# Patient Record
Sex: FEMALE | Race: WHITE | NOT HISPANIC OR LATINO | Employment: FULL TIME | URBAN - METROPOLITAN AREA
[De-identification: names, ages, dates, MRNs, and addresses within clinical notes are randomized per-mention and may not be internally consistent; named-entity substitution may affect disease eponyms.]

---

## 2017-10-30 ENCOUNTER — NON-PROVIDER VISIT (OUTPATIENT)
Dept: URGENT CARE | Facility: PHYSICIAN GROUP | Age: 22
End: 2017-10-30

## 2017-10-31 NOTE — NON-PROVIDER
Vika Alvarez is a 21 y.o. female here for a non-provider visit for PPD placement -- Step 1 of 1    Reason for PPD:  work requirement    1. TB evaluation questionnaire completed by patient? Yes      -  If any answers marked yes did you contact a provider prior to placing? Not Indicated  2.  Patient notified to return to clinic for reading on: 11/01-11/02/2017  3.  PPD Placement documentation completed on TB evaluation questionnaire? Yes  4.  Location of TB evaluation questionnaire filed: Front office folder

## 2017-11-01 ENCOUNTER — NON-PROVIDER VISIT (OUTPATIENT)
Dept: URGENT CARE | Facility: PHYSICIAN GROUP | Age: 22
End: 2017-11-01

## 2017-11-01 NOTE — NON-PROVIDER
Vika Alvarez is a 21 y.o. female here for a non-provider visit for PPD reading -- Step 1 of 1.      1.  Resulted in Epic under enter/edit results? Yes   2.  TB evaluation questionnaire scanned into chart and original given to patient?Yes      3. Was induration greater than 0 mm? No.    If Step 1 of 2, when is patient returning for second step (delete if N/A): n/a    Routed to PCP? No

## 2019-04-25 ENCOUNTER — HOSPITAL ENCOUNTER (OUTPATIENT)
Dept: LAB | Facility: MEDICAL CENTER | Age: 24
End: 2019-04-25
Attending: OBSTETRICS & GYNECOLOGY
Payer: OTHER GOVERNMENT

## 2019-04-25 LAB
CYTOLOGY REG CYTOL: NORMAL
PATHOLOGY CONSULT NOTE: NORMAL

## 2019-04-25 PROCEDURE — 88305 TISSUE EXAM BY PATHOLOGIST: CPT

## 2019-04-25 PROCEDURE — 87624 HPV HI-RISK TYP POOLED RSLT: CPT

## 2019-04-25 PROCEDURE — 88175 CYTOPATH C/V AUTO FLUID REDO: CPT

## 2019-05-01 LAB
HPV HR 12 DNA CVX QL NAA+PROBE: POSITIVE
HPV16 DNA SPEC QL NAA+PROBE: NEGATIVE
HPV18 DNA SPEC QL NAA+PROBE: NEGATIVE
SPECIMEN SOURCE: ABNORMAL

## 2019-08-17 ENCOUNTER — NON-PROVIDER VISIT (OUTPATIENT)
Dept: URGENT CARE | Facility: PHYSICIAN GROUP | Age: 24
End: 2019-08-17
Payer: OTHER GOVERNMENT

## 2019-08-17 DIAGNOSIS — Z11.1 SCREENING FOR TUBERCULOSIS: Primary | ICD-10-CM

## 2019-08-17 PROCEDURE — 86580 TB INTRADERMAL TEST: CPT | Performed by: FAMILY MEDICINE

## 2019-08-17 NOTE — PROGRESS NOTES
Vika Alvarez is a 23 y.o. female here for a non-provider visit for PPD placement -- Step 1 of 1    Reason for PPD:  work requirement    1. TB evaluation questionnaire completed by patient? Yes      -  If any answers marked yes did you contact a provider prior to placing? No  2.  Patient notified to return to clinic for reading on: 08/19/2019  3.  PPD Placement documentation completed on TB evaluation questionnaire? Yes  4.  Location of TB evaluation questionnaire filed: front office

## 2019-08-19 ENCOUNTER — NON-PROVIDER VISIT (OUTPATIENT)
Dept: URGENT CARE | Facility: PHYSICIAN GROUP | Age: 24
End: 2019-08-19

## 2019-08-19 LAB — TB WHEAL 3D P 5 TU DIAM: 0 MM

## 2019-08-19 NOTE — PROGRESS NOTES
Vika Alvarez is a 23 y.o. female here for a non-provider visit for PPD reading -- Step 1 of 1.      1.  Resulted in Epic under enter/edit results? Yes   2.  TB evaluation questionnaire scanned into chart and original given to patient?Yes      3. Was induration greater than 0 mm? No.    Routed to PCP? No

## 2019-09-11 ENCOUNTER — HOSPITAL ENCOUNTER (OUTPATIENT)
Dept: RADIOLOGY | Facility: MEDICAL CENTER | Age: 24
End: 2019-09-11
Attending: FAMILY MEDICINE
Payer: OTHER GOVERNMENT

## 2019-09-11 ENCOUNTER — OFFICE VISIT (OUTPATIENT)
Dept: URGENT CARE | Facility: PHYSICIAN GROUP | Age: 24
End: 2019-09-11
Payer: OTHER GOVERNMENT

## 2019-09-11 VITALS
HEIGHT: 64 IN | TEMPERATURE: 98.4 F | WEIGHT: 130 LBS | OXYGEN SATURATION: 96 % | RESPIRATION RATE: 12 BRPM | DIASTOLIC BLOOD PRESSURE: 76 MMHG | HEART RATE: 82 BPM | SYSTOLIC BLOOD PRESSURE: 116 MMHG | BODY MASS INDEX: 22.2 KG/M2

## 2019-09-11 DIAGNOSIS — S67.01XA CRUSHING INJURY OF RIGHT THUMB, INITIAL ENCOUNTER: ICD-10-CM

## 2019-09-11 PROCEDURE — 99213 OFFICE O/P EST LOW 20 MIN: CPT | Performed by: FAMILY MEDICINE

## 2019-09-11 PROCEDURE — 73140 X-RAY EXAM OF FINGER(S): CPT | Mod: RT

## 2019-09-12 NOTE — PATIENT INSTRUCTIONS
Crush Injury, Fingers or Toes  A crush injury to the fingers or toes means the tissues have been damaged by being squeezed (compressed). There will be bleeding into the tissues and swelling. Often, blood will collect under the skin. When this happens, the skin on the finger often dies and may slough off (shed) 1 week to 10 days later. Usually, new skin is growing underneath. If the injury has been too severe and the tissue does not survive, the damaged tissue may begin to turn black over several days.   Wounds which occur because of the crushing may be stitched (sutured) shut. However, crush injuries are more likely to become infected than other injuries. These wounds may not be closed as tightly as other types of cuts to prevent infection. Nails involved are often lost. These usually grow back over several weeks.   DIAGNOSIS  X-rays may be taken to see if there is any injury to the bones.  TREATMENT  Broken bones (fractures) may be treated with splinting, depending on the fracture. Often, no treatment is required for fractures of the last bone in the fingers or toes.  HOME CARE INSTRUCTIONS   · The crushed part should be raised (elevated) above the heart or center of the chest as much as possible for the first several days or as directed. This helps with pain and lessens swelling. Less swelling increases the chances that the crushed part will survive.  · Put ice on the injured area.  ¨ Put ice in a plastic bag.  ¨ Place a towel between your skin and the bag.  ¨ Leave the ice on for 15-20 minutes, 03-04 times a day for the first 2 days.  · Only take over-the-counter or prescription medicines for pain, discomfort, or fever as directed by your caregiver.  · Use your injured part only as directed.  · Change your bandages (dressings) as directed.  · Keep all follow-up appointments as directed by your caregiver. Not keeping your appointment could result in a chronic or permanent injury, pain, and disability. If there is  any problem keeping the appointment, you must call to reschedule.  SEEK IMMEDIATE MEDICAL CARE IF:   · There is redness, swelling, or increasing pain in the wound area.  · Pus is coming from the wound.  · You have a fever.  · You notice a bad smell coming from the wound or dressing.  · The edges of the wound do not stay together after the sutures have been removed.  · You are unable to move the injured finger or toe.  MAKE SURE YOU:   · Understand these instructions.  · Will watch your condition.  · Will get help right away if you are not doing well or get worse.     This information is not intended to replace advice given to you by your health care provider. Make sure you discuss any questions you have with your health care provider.     Document Released: 12/18/2006 Document Revised: 03/11/2013 Document Reviewed: 05/04/2012  [employer] Interactive Patient Education ©2016 [employer]

## 2019-09-19 ASSESSMENT — ENCOUNTER SYMPTOMS
NUMBNESS: 0
FEVER: 0
JOINT SWELLING: 0
CHILLS: 0

## 2019-09-19 NOTE — PROGRESS NOTES
"Subjective:   Vika Alvarez is a 23 y.o. female who presents for Hand Injury (R thumb smashed in truck door, numb, tingling, painful to bend xthis morning)     Hand Injury   This is a new problem. The current episode started today. The problem occurs constantly. The problem has been gradually worsening. Pertinent negatives include no chills, fever, joint swelling or numbness.     Review of Systems   Constitutional: Negative for chills and fever.   Musculoskeletal: Negative for joint swelling.   Neurological: Negative for numbness.      Objective:   /76 (BP Location: Right arm, Patient Position: Sitting, BP Cuff Size: Adult)   Pulse 82   Temp 36.9 °C (98.4 °F) (Temporal)   Resp 12   Ht 1.626 m (5' 4\")   Wt 59 kg (130 lb)   LMP 08/06/2019 (Within Days)   SpO2 96%   BMI 22.31 kg/m²   Physical Exam   Constitutional: She is oriented to person, place, and time. She appears well-developed and well-nourished. No distress.   Eyes: Pupils are equal, round, and reactive to light. EOM are normal.   Cardiovascular: Normal rate, regular rhythm, normal heart sounds and intact distal pulses.   Pulmonary/Chest: Effort normal and breath sounds normal. No respiratory distress.   Abdominal: Soft. Bowel sounds are normal. She exhibits no distension.   Musculoskeletal: Normal range of motion.        Arms:  Neurological: She is alert and oriented to person, place, and time. She has normal reflexes.   Skin: Skin is warm and dry.   Psychiatric: She has a normal mood and affect. Her behavior is normal.        Assessment/Plan:   1. Crushing injury of right thumb, initial encounter  - DX-FINGER(S) 2+ RIGHT; Future  No acute fracture on my read. Use over-the-counter pain reliever, such as acetaminophen (Tylenol), ibuprofen (Advil, Motrin) or naproxen (Aleve) as needed; follow package directions for dosing.   Differential diagnosis, natural history, supportive care, and indications for immediate follow-up discussed.    "

## 2020-10-17 ENCOUNTER — OCCUPATIONAL MEDICINE (OUTPATIENT)
Dept: URGENT CARE | Facility: PHYSICIAN GROUP | Age: 25
End: 2020-10-17
Payer: COMMERCIAL

## 2020-10-17 VITALS
WEIGHT: 130 LBS | BODY MASS INDEX: 22.2 KG/M2 | SYSTOLIC BLOOD PRESSURE: 108 MMHG | RESPIRATION RATE: 18 BRPM | OXYGEN SATURATION: 98 % | DIASTOLIC BLOOD PRESSURE: 64 MMHG | HEIGHT: 64 IN | TEMPERATURE: 97.3 F | HEART RATE: 66 BPM

## 2020-10-17 DIAGNOSIS — S39.012A STRAIN OF LUMBAR REGION, INITIAL ENCOUNTER: ICD-10-CM

## 2020-10-17 DIAGNOSIS — R25.2 SPASM: ICD-10-CM

## 2020-10-17 PROCEDURE — 99214 OFFICE O/P EST MOD 30 MIN: CPT | Performed by: FAMILY MEDICINE

## 2020-10-17 RX ORDER — CYCLOBENZAPRINE HCL 10 MG
10 TABLET ORAL 3 TIMES DAILY PRN
Qty: 20 TAB | Refills: 0 | Status: SHIPPED | OUTPATIENT
Start: 2020-10-17 | End: 2024-02-20

## 2020-10-17 ASSESSMENT — ENCOUNTER SYMPTOMS
FLANK PAIN: 0
FOCAL WEAKNESS: 0
CHILLS: 0
SENSORY CHANGE: 0
FEVER: 0

## 2020-10-17 NOTE — LETTER
"EMPLOYEE’S CLAIM FOR COMPENSATION/ REPORT OF INITIAL TREATMENT  FORM C-4    EMPLOYEE’S CLAIM - PROVIDE ALL INFORMATION REQUESTED   First Name  Vika Last Name  Antonio Birthdate                    1995                Sex  female Claim Number   Home Address  [address] Age  24 y.o. 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 (if applicable)  Transfers/Changing residents     How did this injury or occupational disease occur? (Be specific an answer in detail. Use additional sheet if necessary)  1.) one resident was not helping transfer; could feel the strain starting in my back 2.) when changing a resident, was not in good position to due chair.  so or have proper help or equipment changed in a reclinging.     If you believe that you have an occupational disease, when did you first have knowledge of the disability and it relationship to your employment?  N/A Witnesses to the Accident  N/A      Nature of Injury or Occupational Disease  Strain  Part(s) of Body Injured or Affected  Lower Back Area (Lumbar Area & Lumbo-Sacral), N/A, N/A "    I certify that the above is true and correct to the best of my knowledge and that I have provided this information in order to obtain the benefits of [place]  I hereby authorize any physician, chiropractor, surgeon, practitioner, or other person, any hospital, including [facility] or [facility], any medical service organization, any insurance company, or other institution or organization to release to each other, any medical or other information, including benefits paid or payable, pertinent to this injury or disease, except information relative to diagnosis, treatment and/or counseling for AIDS, psychological conditions, alcohol or controlled substances, for which I must give specific authorization.  A Photostat of this authorization shall be as valid as the original.     Date   Place   Employee’s Signature   THIS REPORT MUST BE COMPLETED AND MAILED WITHIN 3 WORKING DAYS OF TREATMENT   Place  [facility]  Name of Facility  [facility]   Date  10/17/2020 Diagnosis  (S39.012A) Strain of lumbar region, initial encounter  (R25.2) Spasm Is there evidence the injured employee was under the              influence of alcohol and/or another controlled substance at the time of accident?   Hour  11:57 AM Description of Injury or Disease  Diagnoses of Strain of lumbar region, initial encounter and Spasm were pertinent to this visit. No   Treatment  relative rest, ice, otc nsaid, cyclobenzaprine    Have you advised the patient to remain off work five days or     more? No   X-Ray Findings    Comments:NA   If Yes   From Date  To Date      From information given by the employee, together with medical evidence, can you directly connect this injury or occupational disease as job incurred?  Yes If No Full Duty    No Modified Duty  Yes   Is additional medical care by a physician  "indicated?  Yes If Modified Duty, Specify any Limitations / Restrictions  Bending and stooping limited to 1 hour  Pushing, pulling, lifting, carrying limited to 2 hours  Weight limit 10 pounds   Do you know of any previous injury or disease contributing to this condition or occupational disease?                            No   Date  10/17/2020 Print Doctor’s Name   Brien Gunn M.D. I certify the employer’s copy of  this form was mailed on:   Address  [address] Insurer’s Use Only     [place]  [address]    Provider’s Tax ID Number  [id number] Telephone  Dept: [phone]      berkley-BRIEN Tipton M.D.  Signature:     Degree          ORIGINAL-TREATING PHYSICIAN OR CHIROPRACTOR    PAGE 2-INSURER/TPA    PAGE 3-EMPLOYER    PAGE 4-EMPLOYEE        Form C-4 (rev.10/07)          BRIEF DESCRIPTION OF RIGHTS AND BENEFITS  (Pursuant to [place] 616C.050)    Notice of Injury or Occupational Disease (Incident Report Form C-1): If an injury or occupational disease (OD) arises out of and in the course of employment, you must provide written notice to your employer as soon as practicable, but no later than 7 days after the accident or OD. Your employer shall maintain a sufficient supply of the required forms.     Claim for Compensation (Form C-4): If medical treatment is sought, the form C-4 is available at the place of initial treatment. A completed \"Claim for Compensation\" (Form C-4) must be filed within 90 days after an accident or OD. The treating physician or chiropractor must, within 3 working days after treatment, complete and mail to the employer, the employer's insurer and third-party , the Claim for Compensation.     Medical Treatment: If you require medical treatment for your on-the-job injury or OD, you may be required to select a physician or chiropractor from a list provided by your workers’ compensation insurer, if it has contracted with an Organization for Managed Care " (MCO) or Preferred Provider Organization (PPO) or providers of health care. If your employer has not entered into a contract with an MCO or PPO, you may select a physician or chiropractor from the Panel of Physicians and Chiropractors. Any medical costs related to your industrial injury or OD will be paid by your insurer.     Temporary Total Disability (TTD): If your doctor has certified that you are unable to work for a period of at least 5 consecutive days, or 5 cumulative days in a 20-day period, or places restrictions on you that your employer does not accommodate, you may be entitled to TTD compensation.     Temporary Partial Disability (TPD): If the wage you receive upon reemployment is less than the compensation for TTD to which you are entitled, the insurer may be required to pay you TPD compensation to make up the difference. TPD can only be paid for a maximum of 24 months.     Permanent Partial Disability (PPD): When your medical condition is stable and there is an indication of a PPD as a result of your injury or OD, within 30 days, your insurer must arrange for an evaluation by a rating physician or chiropractor to determine the degree of your PPD. The amount of your PPD award depends on the date of injury, the results of the PPD evaluation and your age and wage.     Permanent Total Disability (PTD): If you are medically certified by a treating physician or chiropractor as permanently and totally disabled and have been granted a PTD status by your insurer, you are entitled to receive monthly benefits not to exceed 66 2/3% of your average monthly wage. The amount of your PTD payments is subject to reduction if you previously received a PPD award.     Vocational Rehabilitation Services: You may be eligible for vocational rehabilitation services if you are unable to return to the job due to a permanent physical impairment or permanent restrictions as a result of your injury or occupational disease.          Transportation and Per Angi Reimbursement: You may be eligible for travel expenses and per angi associated with medical treatment.     Reopening: You may be able to reopen your claim if your condition worsens after claim closure.     Appeal Process: If you disagree with a written determination issued by the insurer or the insurer does not respond to your request, you may appeal to the Department of Administration, , by following the instructions contained in your determination letter. You must appeal the determination within 70 days from the date of the determination letter at [address], [address], [place], [place] [address], or [address], [address], [place], [place] [address]. If you disagree with the  decision, you may appeal to the Department of Administration, . You must file your appeal within 30 days from the date of the  decision letter at [address], [address], [place], [place] [address], or [address], [address], [place], [place] [address]. If you disagree with a decision of an , you may file a petition for judicial review with the District Court. You must do so within 30 days of the Appeal Officer’s decision. You may be represented by an  at your own expense or you may contact the [facility] for possible representation.     [place]  for Injured Workers (NAIW): If you disagree with a  decision, you may request that NAIW represent you without charge at an  Hearing. For information regarding denial of benefits, you may contact the [facility] at: [address], [address], [place] [address], [phone], or [address], [address] [address], [phone]     To File a Complaint with the Division: If you wish to file a complaint with the  of the Division of Industrial Relations (DIR), please contact the Workers’  Compensation Section, [address], [address], [place], [place] [address], telephone [phone], or [address], [address], [place], [place] [address], telephone [phone].     For assistance with Workers’ Compensation Issues: You may contact the Office of the Governor Consumer Health Assistance, [address] [place] [address], [address], [place], [place] [address], Toll Free 1-117.604.7351, Web site: http://govcha.[facility].nv., E-mail [email or web address]              __________________________________________________________________                              ___________________         Employee Name / Signature                                                                                                                     Date                                                                                                                                                                                       D-2 (rev. 01/20)

## 2020-10-17 NOTE — PROGRESS NOTES
"Subjective:      Vika Alvarez is a 24 y.o. female who presents with Work-Related Injury (DOI 10/16/2020. patiwent states that she injured her back with 2 residents. one patient she had to change in recliner causing pain in her lower to mid back, other resident didnt help while transerfering to other location. 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 Pain severity 6/10. No radiation. Pain worse with bending down and sitting. OTC tylenol with minimal improvement. No myelopathy. No fever. No PMH cancer/no unwanted weight loss. No prior injury or surgery. No second job or outside activity contributing.      HPI    Review of Systems   Constitutional: Negative for chills and fever.   Genitourinary: Negative for flank pain and hematuria.   Skin: Negative for rash.   Neurological: Negative for sensory change and focal weakness.     .  Medications, Allergies, and current problem list reviewed today in Epic       Objective:     /64 (BP Location: Left arm, Patient Position: Sitting, BP Cuff Size: Adult)   Pulse 66   Temp 36.3 °C (97.3 °F) (Temporal)   Resp 18   Ht 1.626 m (5' 4\")   Wt 59 kg (130 lb)   SpO2 98%   BMI 22.31 kg/m²      Physical Exam  Constitutional:       Appearance: Normal appearance.   Eyes:      Conjunctiva/sclera: Conjunctivae normal.   Neck:      Musculoskeletal: Normal range of motion and neck supple.   Skin:     General: Skin is warm and dry.   Neurological:      General: No focal deficit present.      Mental Status: She is alert and oriented to person, place, and time.         Back: tender and spasm left paralumber region, full range of motion. Pain reproduced with full flexion and right rotation.   Neuro: Bilateral lower extremity strength and sensory intact.  Negative straight leg raise. DTR 2+/4 bilateral knees         Assessment/Plan:        1. Strain of lumbar region, " initial encounter      2. Spasm    - cyclobenzaprine (FLEXERIL) 10 mg Tab; Take 1 Tab by mouth 3 times a day as needed for Muscle Spasms.  Dispense: 20 Tab; Refill: 0    Differential diagnosis, natural history, supportive care, and indications for immediate follow-up discussed at length.     Light duty OD 39  Relative rest, ice, OTC NSAID  Follow-up Wednesday 10/21

## 2020-10-17 NOTE — LETTER
[facility]  [address] - [place] [address]  Phone:  [phone] - Fax:  [phone]   [facility] Progress Report and Disability Certification  Date of Service: 10/17/2020   No Show:  No  Date / Time of Next Visit: 10/21/2020@3:00PM   Claim Information   Patient Name: Vika Alvarez  Claim Number:     Employer:   [employer] Date of Injury: 10/16/2020     Insurer / TPA:    ID / SSN:     Occupation: Med Tech/Caregiver  Diagnosis: Diagnoses of Strain of lumbar region, initial encounter and Spasm were pertinent to this visit.    Medical Information   Related to Industrial Injury? 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 Pain severity 6/10. No radiation. Pain worse with bending down and sitting. OTC tylenol with minimal improvement. No myelopathy. No fever. No PMH cancer/no unwanted weight loss. No prior injury or surgery. No second job or outside activity contributing.    Objective Findings: Back: tender and spasm left paralumber region, full range of motion. Pain reproduced with full flexion and right rotation.   Neuro: Bilateral lower extremity strength and sensory intact.  Negative straight leg raise. DTR 2+/4 bilateral knees     Pre-Existing Condition(s):     Assessment:   Initial Visit    Status: Additional Care Required  Permanent Disability:No    Plan:   Comments:relative rest, ice, otc nsaid, cyclobenzaprine    Diagnostics:   Comments:NA    Comments:       Disability Information   Status: Released to Restricted Duty    From:  10/17/2020  Through: 10/21/2020 Restrictions are: Temporary   Physical Restrictions   Sitting:    Standing:    Stooping:  < or = to 1 hr/day Bending:  < or = to 1 hr/day   Squatting:    Walking:    Climbing:    Pushing:  < or = to 2 hrs/day   Pulling:  < or = to 2  hrs/day Other:    Reaching Above Shoulder (L):   Reaching Above Shoulder (R):       Reaching Below Shoulder (L):    Reaching Below Shoulder (R):      Not to exceed Weight Limits   Carrying(hrs): 2 Weight Limit(lb): < or = to 10 pounds Lifting(hrs): 2 Weight  Limit(lb): < or = to 10 pounds   Comments:      Repetitive Actions   Hands: i.e. Fine Manipulations from Grasping:     Feet: i.e. Operating Foot Controls:     Driving / Operate Machinery:     Provider Name:   Brien Gunn M.D. Physician Signature:  Physician Name:     Clinic Name / Location: [address]  [place] [address] Clinic Phone Number: Dept: [phone]   Appointment Time: 11:15 Am Visit Start Time: 11:57 AM   Check-In Time:  11:44 Am Visit Discharge Time:  12:18PM   Original-Treating Physician or Chiropractor    Page 2-Insurer/TPA    Page 3-Employer    Page 4-Employee

## 2020-10-21 ENCOUNTER — OCCUPATIONAL MEDICINE (OUTPATIENT)
Dept: URGENT CARE | Facility: PHYSICIAN GROUP | Age: 25
End: 2020-10-21
Payer: COMMERCIAL

## 2020-10-21 VITALS
BODY MASS INDEX: 22.2 KG/M2 | RESPIRATION RATE: 16 BRPM | OXYGEN SATURATION: 99 % | HEIGHT: 64 IN | WEIGHT: 130 LBS | TEMPERATURE: 98.4 F | SYSTOLIC BLOOD PRESSURE: 112 MMHG | HEART RATE: 82 BPM | DIASTOLIC BLOOD PRESSURE: 74 MMHG

## 2020-10-21 DIAGNOSIS — S39.012D STRAIN OF LUMBAR REGION, SUBSEQUENT ENCOUNTER: ICD-10-CM

## 2020-10-21 PROCEDURE — 99214 OFFICE O/P EST MOD 30 MIN: CPT | Performed by: PHYSICIAN ASSISTANT

## 2020-10-21 ASSESSMENT — ENCOUNTER SYMPTOMS
ABDOMINAL PAIN: 0
SHORTNESS OF BREATH: 0
MUSCULOSKELETAL NEGATIVE: 1
CHILLS: 0
FEVER: 0
VOMITING: 0
DIARRHEA: 0
NAUSEA: 0
DIZZINESS: 0

## 2020-10-21 ASSESSMENT — PAIN SCALES - GENERAL: PAINLEVEL: 6=MODERATE PAIN

## 2020-10-21 NOTE — LETTER
[facility]  [address] [place] [address]. [address] - [place] [address]  Phone:  [phone] - Fax:  [phone]   [facility] Progress Report and Disability Certification  Date of Service: 10/21/2020   No Show:  No  Date / Time of Next Visit: 10/25/2020 @9:30AM   Claim Information   Patient Name: Vika Alvarez  Claim Number:     Employer:   [employer] Date of Injury: 10/16/2020     Insurer / TPA: [employer]  ID / SSN:     Occupation: Med Tech/Caregiver  Diagnosis: The encounter diagnosis was Strain of lumbar region, subsequent encounter.    Medical Information   Related to Industrial Injury? 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 No radiating pain. Pain worse with bending down and sitting. No myelopathy. No fever. No prior injury or surgery. No second job or outside activity contributing.  She was given flexeril at her initial visit that she has been taking as needed for stiffness with good relief. She has also been taking OTC ibuprofen intermittently, although she states the pain hasn't improved much since her initial visit.     Objective Findings: Musculoskeletal:      Lumbar back: She exhibits decreased range of motion, tenderness and pain. She exhibits no bony tenderness.      Comments: No midline spinal tenderness or step off deformities noted. Straight leg raise negative.       Pre-Existing Condition(s): None    Assessment:   Condition Same    Status: Additional Care Required  Permanent Disability:No    Plan: Medication  Comments:flexeril    Diagnostics:      Comments:       Disability Information   Status: Released to Restricted Duty    From:  10/21/2020  Through: 10/25/2020 Restrictions are: Temporary   Physical Restrictions   Sitting:    Standing:    Stooping:  < or = to 1 hr/day Bending:  < or = to 1  hr/day   Squatting:    Walking:    Climbing:    Pushing:  < or = to 2 hrs/day   Pulling:  < or = to 2 hrs/day Other:    Reaching Above Shoulder (L):   Reaching Above Shoulder (R):       Reaching Below Shoulder (L):    Reaching Below Shoulder (R):      Not to exceed Weight Limits   Carrying(hrs):   Weight Limit(lb): < or = to 10 pounds Lifting(hrs):   Weight  Limit(lb): < or = to 10 pounds   Comments: Encouraged icing/heating 2-3 times daily followed by gentle massage and stretching  OTC nsaids as needed for pain  Continue flexeril as needed for stiffness  RTC in 4 days for follow up    Repetitive Actions   Hands: i.e. Fine Manipulations from Grasping:     Feet: i.e. Operating Foot Controls:     Driving / Operate Machinery:     Provider Name:   Zaynab Wei P.A.-C. Physician Signature:  Physician Name:     Clinic Name / Location: [address]  [place] [address] Clinic Phone Number: Dept: [phone]   Appointment Time: 5:30 Pm Visit Start Time: 5:51 PM   Check-In Time:  5:19 Pm Visit Discharge Time:  6:17 PM   Original-Treating Physician or Chiropractor    Page 2-Insurer/TPA    Page 3-Employer    Page 4-Employee

## 2020-10-22 NOTE — PROGRESS NOTES
"Subjective:      Vika Alvarez is a 24 y.o. 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 No radiating pain. Pain worse with bending down and sitting. No myelopathy. No fever. No prior injury or surgery. No second job or outside activity contributing.  She was given flexeril at her initial visit that she has been taking as needed for stiffness with good relief. She has also been taking OTC ibuprofen intermittently, although she states the pain hasn't improved much since her initial visit.         HPI    Review of Systems   Constitutional: Negative for chills and fever.   HENT: Negative for congestion.    Respiratory: Negative for shortness of breath.    Cardiovascular: Negative for chest pain.   Gastrointestinal: Negative for abdominal pain, diarrhea, nausea and vomiting.   Genitourinary: Negative.    Musculoskeletal: Negative.         + back pain   Skin: Negative for rash.   Neurological: Negative for dizziness.        Objective:     /74 (BP Location: Right arm, Patient Position: Sitting, BP Cuff Size: Adult)   Pulse 82   Temp 36.9 °C (98.4 °F) (Temporal)   Resp 16   Ht 1.626 m (5' 4\")   Wt 59 kg (130 lb)   SpO2 99%   BMI 22.31 kg/m²      Physical Exam  Vitals signs and nursing note reviewed.   Constitutional:       General: She is not in acute distress.     Appearance: Normal appearance. She is well-developed. She is not diaphoretic.   HENT:      Head: Normocephalic and atraumatic.      Right Ear: External ear normal.      Left Ear: External ear normal.      Nose: Nose normal.      Mouth/Throat:      Mouth: Mucous membranes are moist.   Eyes:      Pupils: Pupils are equal, round, and reactive to light.   Neck:      " Musculoskeletal: Normal range of motion.   Cardiovascular:      Rate and Rhythm: Normal rate.   Pulmonary:      Effort: Pulmonary effort is normal.   Musculoskeletal:      Lumbar back: She exhibits decreased range of motion, tenderness and pain. She exhibits no bony tenderness.        Back:       Comments: No midline spinal tenderness or step off deformities noted. Straight leg raise negative.    Skin:     General: Skin is warm and dry.   Neurological:      Mental Status: She is alert and oriented to person, place, and time.   Psychiatric:         Behavior: Behavior normal.               Assessment/Plan:        1. Strain of lumbar region, subsequent encounter    Encouraged icing/heating 2-3 times daily followed by gentle massage and stretching   OTC nsaids as needed for pain   Continue flexeril as needed for stiffness   RTC in 4 days for follow up

## 2020-10-25 ENCOUNTER — OCCUPATIONAL MEDICINE (OUTPATIENT)
Dept: URGENT CARE | Facility: PHYSICIAN GROUP | Age: 25
End: 2020-10-25
Payer: COMMERCIAL

## 2020-10-25 VITALS
WEIGHT: 130 LBS | TEMPERATURE: 98 F | DIASTOLIC BLOOD PRESSURE: 70 MMHG | SYSTOLIC BLOOD PRESSURE: 108 MMHG | BODY MASS INDEX: 22.2 KG/M2 | HEART RATE: 65 BPM | RESPIRATION RATE: 18 BRPM | HEIGHT: 64 IN | OXYGEN SATURATION: 98 %

## 2020-10-25 DIAGNOSIS — S39.012D STRAIN OF LUMBAR REGION, SUBSEQUENT ENCOUNTER: ICD-10-CM

## 2020-10-25 DIAGNOSIS — R25.2 SPASM: ICD-10-CM

## 2020-10-25 PROCEDURE — 99214 OFFICE O/P EST MOD 30 MIN: CPT | Performed by: PHYSICIAN ASSISTANT

## 2020-10-25 ASSESSMENT — PAIN SCALES - GENERAL: PAINLEVEL: 2=MINIMAL-SLIGHT

## 2020-10-25 NOTE — PROGRESS NOTES
"Subjective:     Vika Alvarez is a 24 y.o. 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 No radiating pain. Pain worse with bending down and sitting. No myelopathy. No fever. No prior injury or surgery. No second job or outside activity contributing.  She was given flexeril at her initial visit that she has been taking at nighttime because this causes her to be drowsy.  This has been providing some relief.  She notes moderate improvement since her last visit.  Her pain is mild at rest exacerbated to approximately 6 out of 10 with bending, twisting, and other certain movements.  She has has tried ice with some improvement.  Tolerating work restrictions.  She believes she is on the right track to resolution of symptoms.  Denies any numbness, tingling, weakness.  No radiation down to her legs.  No loss of bowel or bladder control.    PMH:   No pertinent past medical history to this problem  MEDS:  Medications were reviewed in EMR  ALLERGIES:  Allergies were reviewed in EMR  [employer]:  Works as as Med Tech  FH:   No pertinent family history to this problem       Objective:     /70 (BP Location: Left arm, Patient Position: Sitting, BP Cuff Size: Adult)   Pulse 65   Temp 36.7 °C (98 °F) (Temporal)   Resp 18   Ht 1.626 m (5' 4\")   Wt 59 kg (130 lb)   SpO2 98%   BMI 22.31 kg/m²     Constitutional: Well-appearing, in no acute distress.  Back: Full range of flexion, extension, rotation, lateralization.   Mild tenderness and spasm to palpation to mid to lower paraspinal muscles.   Negative midline tenderness, bony tenderness, crepitus, deformities, or step-offs.  Negative straight leg raise  Extremities: Full ROM and strength against resistance to lower extremities. "   Patellar and Achilles DTRs intact.             Assessment/Plan:       1. Strain of lumbar region, subsequent encounter    2. Spasm    • Released to Restricted Duty FROM 10/25/2020 TO 10/30/2020  • No transferring   • Plan:    • Continue no heavy lifting, stooping, or strenuous activity. Work restrictions per D39. Massage, ice and/or heat which ever feels better, and Ibuprofen per manufacture's instructions. Flexeril as prescribed. Recommended taking only at night t  • yue now. Discussed side effects of drowsiness and recommended not working or driving on this medication. Encouraged walking, stretching, and range of motion exercises as tolerated. Avoid sitting or laying down for long periods of time except for at n  • ight during sleep.   • Follow up here for re-evaluation here in 5 days. Anticipate most likely discharge MMI next visit.   •   •   •   •   •     Differential diagnosis, natural history, supportive care, and indications for immediate follow-up discussed.

## 2020-10-25 NOTE — LETTER
[facility]  [address] - [place] [address]  Phone:  [phone] - Fax:  [phone]   [facility] Progress Report and Disability Certification  Date of Service: 10/25/2020   No Show:  No  Date / Time of Next Visit: 10/30/2020@9:00AM   Claim Information   Patient Name: Vika Alvarez  Claim Number:     Employer:   [employer]  Date of Injury: 10/16/2020     Insurer / TPA: [employer]  ID / SSN:     Occupation: Med Tech/Caregiver  Diagnosis: Diagnoses of Strain of lumbar region, subsequent encounter and Spasm were pertinent to this visit.    Medical Information   Related to Industrial Injury? 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 No radiating pain. Pain worse with bending down and sitting. No myelopathy. No fever. No prior injury or surgery. No second job or outside activity contributing.  She was given flexeril at her initial visit that she has been taking at nighttime because this causes her to be drowsy.  This has been providing some relief.  She notes moderate improvement since her last visit.  Her pain is mild at rest exacerbated to approximately 6 out of 10 with bending, twisting, and other certain movements.  She has has tried ice with some improvement.  Tolerating work restrictions.  She believes she is on the right track to resolution of symptoms.  Denies any numbness, tingling, weakness.  No radiation down to her legs.  No loss of bowel or bladder control.   Objective Findings: Constitutional: Well-appearing, in no acute distress.  Back: Full range of flexion, extension, rotation, lateralization.   Mild tenderness and spasm to palpation to mid to lower paraspinal muscles.   Negative midline tenderness, bony tenderness, crepitus, deformities, or  step-offs.  Negative straight leg raise  Extremities: Full ROM and strength against resistance to lower extremities.   Patellar and Achilles DTRs intact.            Pre-Existing Condition(s):     Assessment:   Condition Improved    Status: Additional Care Required  Permanent Disability:No    Plan:      Diagnostics:      Comments:  Plan:    Continue no heavy lifting, stooping, or strenuous activity. Work restrictions per D39. Massage, ice and/or heat which ever feels better, and Ibuprofen per manufacture's instructions. Flexeril as prescribed. Recommended taking only at night t  yue now. Discussed side effects of drowsiness and recommended not working or driving on this medication. Encouraged walking, stretching, and range of motion exercises as tolerated. Avoid sitting or laying down for long periods of time except for at n  ight during sleep.   Follow up here for re-evaluation here in 5 days. Anticipate most likely discharge MMI next visit.               Disability Information   Status: Released to Restricted Duty    From:  10/25/2020  Through: 10/30/2020 Restrictions are: Temporary   Physical Restrictions   Sitting:    Standing:    Stooping:  < or = to 1 hr/day Bending:  < or = to 1 hr/day   Squatting:    Walking:    Climbing:    Pushing:      Pulling:  < or = to 1 hr/day Other:    Reaching Above Shoulder (L):   Reaching Above Shoulder (R):       Reaching Below Shoulder (L):    Reaching Below Shoulder (R):      Not to exceed Weight Limits   Carrying(hrs): 2 Weight Limit(lb): < or = to 10 pounds Lifting(hrs): 2 Weight  Limit(lb): < or = to 10 pounds   Comments: No transferring     Repetitive Actions   Hands: i.e. Fine Manipulations from Grasping:     Feet: i.e. Operating Foot Controls:     Driving / Operate Machinery:     Provider Name:   Caden Cheung P.A.-C. Physician Signature:  Physician Name:     Clinic Name / Location: [address]  [place] [address] Clinic  Phone Number: Dept: [phone]   Appointment Time: 9:30 Am Visit Start Time: 9:05 AM   Check-In Time:  9:02 Am Visit Discharge Time:  9:35AM   Original-Treating Physician or Chiropractor    Page 2-Insurer/TPA    Page 3-Employer    Page 4-Employee

## 2020-10-30 ENCOUNTER — OCCUPATIONAL MEDICINE (OUTPATIENT)
Dept: URGENT CARE | Facility: PHYSICIAN GROUP | Age: 25
End: 2020-10-30
Payer: COMMERCIAL

## 2020-10-30 VITALS
RESPIRATION RATE: 16 BRPM | BODY MASS INDEX: 22.2 KG/M2 | OXYGEN SATURATION: 97 % | HEIGHT: 64 IN | TEMPERATURE: 97.9 F | WEIGHT: 130 LBS | SYSTOLIC BLOOD PRESSURE: 94 MMHG | HEART RATE: 72 BPM | DIASTOLIC BLOOD PRESSURE: 60 MMHG

## 2020-10-30 DIAGNOSIS — S39.012D STRAIN OF LUMBAR REGION, SUBSEQUENT ENCOUNTER: ICD-10-CM

## 2020-10-30 PROCEDURE — 99213 OFFICE O/P EST LOW 20 MIN: CPT | Performed by: FAMILY MEDICINE

## 2020-10-30 ASSESSMENT — ENCOUNTER SYMPTOMS
FOCAL WEAKNESS: 0
SENSORY CHANGE: 0
NECK PAIN: 0

## 2020-10-30 NOTE — LETTER
[facility]  [address] - [place] [address]  Phone:  [phone] - Fax:  [phone]   [facility] Progress Report and Disability Certification  Date of Service: 10/30/2020   No Show:  No  Date / Time of Next Visit:  discharge MMI   Claim Information   Patient Name: Vika Alvarez  Claim Number:     Employer:   [employer]  Date of Injury: 10/16/2020     Insurer / TPA: [employer]  ID / SSN:     Occupation: Med Tech/Caregiver  Diagnosis: The encounter diagnosis was Strain of lumbar region, subsequent encounter.    Medical Information   Related to Industrial Injury? 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 She is back to pre-injury level of function. Not requiring medication.    Objective Findings: Back: No point tenderness or spasm.  Full range of motion.  Neuro:Bilateral lower extremity strength and sensory intact.  Negative straight leg raise.     Pre-Existing Condition(s):     Assessment:   Condition Improved    Status: Discharged /  MMI  Permanent Disability:No    Plan:      Diagnostics:      Comments:       Disability Information   Status: Released to Full Duty    From:  10/30/2020  Through:   Restrictions are:     Physical Restrictions   Sitting:    Standing:    Stooping:    Bending:      Squatting:    Walking:    Climbing:    Pushing:      Pulling:    Other:    Reaching Above Shoulder (L):   Reaching Above Shoulder (R):       Reaching Below Shoulder (L):    Reaching Below Shoulder (R):      Not to exceed Weight Limits   Carrying(hrs):   Weight Limit(lb):   Lifting(hrs):   Weight  Limit(lb):     Comments:      Repetitive Actions   Hands: i.e. Fine Manipulations from Grasping:     Feet: i.e. Operating Foot Controls:     Driving / Operate  Machinery:     Provider Name:   Brien Gunn M.D. Physician Signature:  Physician Name:     Clinic Name / Location: [address]  [place] [address] Clinic Phone Number: Dept: [phone]   Appointment Time: 9:00 Am Visit Start Time: 9:07 AM   Check-In Time:  9:05 Am Visit Discharge Time: 9:39 AM   Original-Treating Physician or Chiropractor    Page 2-Insurer/TPA    Page 3-Employer    Page 4-Employee

## 2020-10-30 NOTE — PROGRESS NOTES
"Subjective:      Vika Alvarez is a 24 y.o. female who presents with Follow-Up (WC - low back strain.  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 She is back to pre-injury level of function. Not requiring medication.      HPI    Review of Systems   Musculoskeletal: Negative for neck pain.   Skin: Negative for itching and rash.   Neurological: Negative for sensory change and focal weakness.          Objective:     BP (!) 94/60   Pulse 72   Temp 36.6 °C (97.9 °F) (Temporal)   Resp 16   Ht 1.626 m (5' 4\")   Wt 59 kg (130 lb)   SpO2 97%   BMI 22.31 kg/m²      Physical Exam  Constitutional:       Appearance: Normal appearance.   Neck:      Musculoskeletal: Normal range of motion and neck supple.   Skin:     General: Skin is warm and dry.   Neurological:      Mental Status: She is alert.         Back: No point tenderness or spasm.  Full range of motion.  Neuro:Bilateral lower extremity strength and sensory intact.  Negative straight leg raise.         Assessment/Plan:        1. Strain of lumbar region, subsequent encounter    Doing well.  Discharge MMI.    "

## 2021-02-24 ENCOUNTER — NON-PROVIDER VISIT (OUTPATIENT)
Dept: URGENT CARE | Facility: PHYSICIAN GROUP | Age: 26
End: 2021-02-24
Payer: OTHER GOVERNMENT

## 2021-02-24 ENCOUNTER — NON-PROVIDER VISIT (OUTPATIENT)
Dept: URGENT CARE | Facility: PHYSICIAN GROUP | Age: 26
End: 2021-02-24

## 2021-02-24 DIAGNOSIS — Z23 NEED FOR VACCINATION: ICD-10-CM

## 2021-02-24 DIAGNOSIS — Z11.1 PPD SCREENING TEST: ICD-10-CM

## 2021-02-24 PROCEDURE — 90686 IIV4 VACC NO PRSV 0.5 ML IM: CPT | Performed by: PHYSICIAN ASSISTANT

## 2021-02-24 PROCEDURE — 90471 IMMUNIZATION ADMIN: CPT | Performed by: PHYSICIAN ASSISTANT

## 2021-02-24 PROCEDURE — 86580 TB INTRADERMAL TEST: CPT | Performed by: PHYSICIAN ASSISTANT

## 2021-02-26 ENCOUNTER — NON-PROVIDER VISIT (OUTPATIENT)
Dept: URGENT CARE | Facility: PHYSICIAN GROUP | Age: 26
End: 2021-02-26

## 2021-02-26 LAB — TB WHEAL 3D P 5 TU DIAM: NORMAL MM

## 2021-02-26 PROCEDURE — 99999 PR NO CHARGE: CPT | Performed by: NURSE PRACTITIONER

## 2021-02-26 NOTE — PROGRESS NOTES
Vika Alvarez is a 25 y.o. female here for a non-provider visit for PPD reading -- Step 1 of 2.      1.  Resulted in Epic under enter/edit results? Yes   2.  TB evaluation questionnaire scanned into chart and original given to patient?No      3. Was induration greater than 0 mm? No.    If Step 1 of 2, when is patient returning for second step (delete if N/A): 3/3/21    Routed to PCP? No

## 2021-03-04 ENCOUNTER — NON-PROVIDER VISIT (OUTPATIENT)
Dept: MEDICAL GROUP | Facility: PHYSICIAN GROUP | Age: 26
End: 2021-03-04

## 2021-03-04 DIAGNOSIS — Z23 ENCOUNTER FOR VACCINATION: ICD-10-CM

## 2021-03-04 PROCEDURE — 86580 TB INTRADERMAL TEST: CPT | Performed by: PHYSICIAN ASSISTANT

## 2021-03-04 NOTE — PROGRESS NOTES
Vika Alvarez is a 25 y.o. female here for a non-provider visit for PPD placement -- Step 2 of 2    Reason for PPD:  school requirement    1. TB evaluation questionnaire completed by patient? Yes      -  If any answers marked yes did you contact a provider prior to placing? Yes  2.  Patient notified to return to clinic for reading on: 3-6-21 Saturday after 1:38pm or 3-7-21 before 1:38pm   3.  PPD Placement documentation completed on TB evaluation questionnaire? Yes  4.  Location of TB evaluation questionnaire filed: [facility]

## 2021-03-07 ENCOUNTER — NON-PROVIDER VISIT (OUTPATIENT)
Dept: URGENT CARE | Facility: PHYSICIAN GROUP | Age: 26
End: 2021-03-07
Payer: OTHER GOVERNMENT

## 2021-03-07 LAB — TB WHEAL 3D P 5 TU DIAM: 0 MM

## 2021-03-07 NOTE — NON-PROVIDER
Vika Alvarez is a 25 y.o. female here for a non-provider visit for PPD reading -- Step 2 of 2.      1.  Resulted in Epic under enter/edit results? Yes   2.  TB evaluation questionnaire scanned into chart and original given to patient?Yes      3. Was induration greater than 0 mm? No.    If Step 1 of 2, when is patient returning for second step (delete if N/A): N/A    Routed to PCP? No

## 2021-05-18 ENCOUNTER — NON-PROVIDER VISIT (OUTPATIENT)
Dept: URGENT CARE | Facility: PHYSICIAN GROUP | Age: 26
End: 2021-05-18

## 2021-05-18 DIAGNOSIS — Z02.1 PRE-EMPLOYMENT DRUG SCREENING: ICD-10-CM

## 2021-05-18 LAB
AMP AMPHETAMINE: NORMAL
BAR BARBITURATES: NORMAL
BZO BENZODIAZEPINES: NORMAL
COC COCAINE: NORMAL
INT CON NEG: NEGATIVE
INT CON POS: POSITIVE
MDMA ECSTASY: NORMAL
MET METHAMPHETAMINES: NORMAL
MTD METHADONE: NORMAL
OPI OPIATES: NORMAL
OXY OXYCODONE: NORMAL
PCP PHENCYCLIDINE: NORMAL
POC URINE DRUG SCREEN OCDRS: NORMAL
THC: NORMAL

## 2021-05-18 PROCEDURE — 80305 DRUG TEST PRSMV DIR OPT OBS: CPT | Performed by: NURSE PRACTITIONER

## 2023-05-14 ENCOUNTER — APPOINTMENT (OUTPATIENT)
Dept: RADIOLOGY | Facility: MEDICAL CENTER | Age: 28
End: 2023-05-14
Attending: EMERGENCY MEDICINE

## 2023-05-14 ENCOUNTER — HOSPITAL ENCOUNTER (EMERGENCY)
Facility: MEDICAL CENTER | Age: 28
End: 2023-05-14
Attending: EMERGENCY MEDICINE
Payer: COMMERCIAL

## 2023-05-14 VITALS
WEIGHT: 155.65 LBS | RESPIRATION RATE: 18 BRPM | OXYGEN SATURATION: 97 % | BODY MASS INDEX: 26.57 KG/M2 | HEIGHT: 64 IN | HEART RATE: 73 BPM | SYSTOLIC BLOOD PRESSURE: 106 MMHG | DIASTOLIC BLOOD PRESSURE: 64 MMHG | TEMPERATURE: 97.1 F

## 2023-05-14 DIAGNOSIS — Z3A.01 LESS THAN 8 WEEKS GESTATION OF PREGNANCY: ICD-10-CM

## 2023-05-14 DIAGNOSIS — R07.89 ATYPICAL CHEST PAIN: ICD-10-CM

## 2023-05-14 LAB
ALBUMIN SERPL BCP-MCNC: 4.1 G/DL (ref 3.2–4.9)
ALBUMIN/GLOB SERPL: 1.3 G/DL
ALP SERPL-CCNC: 34 U/L (ref 30–99)
ALT SERPL-CCNC: 16 U/L (ref 2–50)
ANION GAP SERPL CALC-SCNC: 10 MMOL/L (ref 7–16)
AST SERPL-CCNC: 14 U/L (ref 12–45)
B-HCG SERPL-ACNC: 2726 MIU/ML (ref 0–5)
BASOPHILS # BLD AUTO: 0.6 % (ref 0–1.8)
BASOPHILS # BLD: 0.07 K/UL (ref 0–0.12)
BILIRUB SERPL-MCNC: 0.4 MG/DL (ref 0.1–1.5)
BUN SERPL-MCNC: 13 MG/DL (ref 8–22)
CALCIUM ALBUM COR SERPL-MCNC: 8.7 MG/DL (ref 8.5–10.5)
CALCIUM SERPL-MCNC: 8.8 MG/DL (ref 8.5–10.5)
CHLORIDE SERPL-SCNC: 105 MMOL/L (ref 96–112)
CO2 SERPL-SCNC: 22 MMOL/L (ref 20–33)
CREAT SERPL-MCNC: 0.62 MG/DL (ref 0.5–1.4)
D DIMER PPP IA.FEU-MCNC: 0.68 UG/ML (FEU) (ref 0–0.5)
EKG IMPRESSION: NORMAL
EOSINOPHIL # BLD AUTO: 0.06 K/UL (ref 0–0.51)
EOSINOPHIL NFR BLD: 0.5 % (ref 0–6.9)
ERYTHROCYTE [DISTWIDTH] IN BLOOD BY AUTOMATED COUNT: 40.9 FL (ref 35.9–50)
GFR SERPLBLD CREATININE-BSD FMLA CKD-EPI: 125 ML/MIN/1.73 M 2
GLOBULIN SER CALC-MCNC: 3.2 G/DL (ref 1.9–3.5)
GLUCOSE SERPL-MCNC: 85 MG/DL (ref 65–99)
HCT VFR BLD AUTO: 38.6 % (ref 37–47)
HGB BLD-MCNC: 13.3 G/DL (ref 12–16)
IMM GRANULOCYTES # BLD AUTO: 0.04 K/UL (ref 0–0.11)
IMM GRANULOCYTES NFR BLD AUTO: 0.3 % (ref 0–0.9)
LIPASE SERPL-CCNC: 30 U/L (ref 11–82)
LYMPHOCYTES # BLD AUTO: 3.13 K/UL (ref 1–4.8)
LYMPHOCYTES NFR BLD: 24.6 % (ref 22–41)
MCH RBC QN AUTO: 31 PG (ref 27–33)
MCHC RBC AUTO-ENTMCNC: 34.5 G/DL (ref 33.6–35)
MCV RBC AUTO: 90 FL (ref 81.4–97.8)
MONOCYTES # BLD AUTO: 0.68 K/UL (ref 0–0.85)
MONOCYTES NFR BLD AUTO: 5.4 % (ref 0–13.4)
NEUTROPHILS # BLD AUTO: 8.73 K/UL (ref 2–7.15)
NEUTROPHILS NFR BLD: 68.6 % (ref 44–72)
NRBC # BLD AUTO: 0 K/UL
NRBC BLD-RTO: 0 /100 WBC
PLATELET # BLD AUTO: 255 K/UL (ref 164–446)
PMV BLD AUTO: 10.2 FL (ref 9–12.9)
POTASSIUM SERPL-SCNC: 3.3 MMOL/L (ref 3.6–5.5)
PROT SERPL-MCNC: 7.3 G/DL (ref 6–8.2)
RBC # BLD AUTO: 4.29 M/UL (ref 4.2–5.4)
SODIUM SERPL-SCNC: 137 MMOL/L (ref 135–145)
TROPONIN T SERPL-MCNC: 6 NG/L (ref 6–19)
WBC # BLD AUTO: 12.7 K/UL (ref 4.8–10.8)

## 2023-05-14 PROCEDURE — 93005 ELECTROCARDIOGRAM TRACING: CPT

## 2023-05-14 PROCEDURE — 700117 HCHG RX CONTRAST REV CODE 255: Performed by: EMERGENCY MEDICINE

## 2023-05-14 PROCEDURE — 36415 COLL VENOUS BLD VENIPUNCTURE: CPT

## 2023-05-14 PROCEDURE — 84702 CHORIONIC GONADOTROPIN TEST: CPT

## 2023-05-14 PROCEDURE — 85379 FIBRIN DEGRADATION QUANT: CPT

## 2023-05-14 PROCEDURE — 83690 ASSAY OF LIPASE: CPT

## 2023-05-14 PROCEDURE — 71275 CT ANGIOGRAPHY CHEST: CPT

## 2023-05-14 PROCEDURE — 93005 ELECTROCARDIOGRAM TRACING: CPT | Performed by: EMERGENCY MEDICINE

## 2023-05-14 PROCEDURE — 84484 ASSAY OF TROPONIN QUANT: CPT

## 2023-05-14 PROCEDURE — 85025 COMPLETE CBC W/AUTO DIFF WBC: CPT

## 2023-05-14 PROCEDURE — 99283 EMERGENCY DEPT VISIT LOW MDM: CPT

## 2023-05-14 PROCEDURE — 80053 COMPREHEN METABOLIC PANEL: CPT

## 2023-05-14 RX ADMIN — IOHEXOL 50 ML: 350 INJECTION, SOLUTION INTRAVENOUS at 16:28

## 2023-05-14 NOTE — ED PROVIDER NOTES
ED Provider Note    CHIEF COMPLAINT  Chief Complaint   Patient presents with    Chest Pain     For the last wk and reports that she is 6 wks pregnant.  Denies vaginal bleeding or abd pain.  Chest pain to the left side of chest around her left breast, pain with deep inspiration, sharp pain.         EXTERNAL RECORDS REVIEWED  None none    HPI/ROS  LIMITATION TO HISTORY   None  OUTSIDE HISTORIAN(S):   provided additional history    Vika Alvarez is a 27 y.o. female who presents for evaluation of reported chest discomfort.  The patient reports that for around a week she has had subjective chest pain.  It is described as sharp, slightly worse with inspiration.  It does not radiate to her shoulder blades.  No report of any fever or productive cough.  No leg swelling.  No strokelike symptoms such as numbness weakness tingling to the arms legs or face.  Notably the patient reports that she may be approximately 6 weeks pregnant.  She her last menstrual cycle was at the end of March.  She denies any known history of thromboembolic disease.  This will be her second pregnancy.  She has not had any prenatal care for this pregnancy yet.  No associated hemoptysis or productive cough.  No reported fevers or chills.  No upper abdominal pain flank pain or urinary symptoms such as dysuria or hematuria.  No report of any vaginal bleeding or discharge..  Patient does report pain is slightly worse with movement in her torso.    PAST MEDICAL HISTORY   No significant medical history    SURGICAL HISTORY  patient denies any surgical history  Major thoracoabdominal surgeries  FAMILY HISTORY  History reviewed. No pertinent family history.  Family history of thromboembolic disease or early coronary artery disease or sudden cardiac death    SOCIAL HISTORY  Social History     Tobacco Use    Smoking status: Never    Smokeless tobacco: Never   Vaping Use    Vaping Use: Not on file   Substance and Sexual Activity    Alcohol use: Not  "Currently    Drug use: Not Currently    Sexual activity: Not on file     Cocaine amphetamines denied  CURRENT MEDICATIONS  Home Medications       Reviewed by Sofia Baptiste R.N. (Registered Nurse) on 05/14/23 at 1342  Med List Status: Partial     Medication Last Dose Status   cyclobenzaprine (FLEXERIL) 10 mg Tab  Active                    ALLERGIES  No Known Allergies    PHYSICAL EXAM  VITAL SIGNS: /76   Pulse 81   Temp 37.1 °C (98.8 °F) (Temporal)   Resp 18   Ht 1.626 m (5' 4\")   Wt 70.6 kg (155 lb 10.3 oz)   LMP 03/29/2023   SpO2 98% Comment: RA  BMI 26.72 kg/m²    Pulse ox interpretation: I interpret this pulse ox as normal.  Constitutional: Alert and oriented x 3, no acute distress  HEENT: Atraumatic normocephalic, pupils are equal round reactive to light extraocular movements are intact. The nares is clear, external ears are normal, mouth shows moist mucous membranes normal dentition for age  Neck: Supple, no JVD no tracheal deviation  Cardiovascular: Regular rate and rhythm no murmur rub or gallop 2+ pulses peripherally x4  Thorax & Lungs: No respiratory distress, no wheezes rales or rhonchi, No chest tenderness.   GI: Soft nontender nondistended positive bowel sounds, no peritoneal signs  Skin: Warm dry no acute rash or lesion  Musculoskeletal: Moving all extremities with full range and 5 of 5 strength no acute  deformity of Homans' sign bilaterally no pedal edema  Neurologic: Cranial nerves III through XII are grossly intact no sensory deficit no cerebellar dysfunction   Psychiatric: Appropriate affect for situation at this time          DIAGNOSTIC STUDIES / PROCEDURES    LABS  Results for orders placed or performed during the hospital encounter of 05/14/23   CBC WITH DIFFERENTIAL   Result Value Ref Range    WBC 12.7 (H) 4.8 - 10.8 K/uL    RBC 4.29 4.20 - 5.40 M/uL    Hemoglobin 13.3 12.0 - 16.0 g/dL    Hematocrit 38.6 37.0 - 47.0 %    MCV 90.0 81.4 - 97.8 fL    MCH 31.0 27.0 - 33.0 pg    " MCHC 34.5 33.6 - 35.0 g/dL    RDW 40.9 35.9 - 50.0 fL    Platelet Count 255 164 - 446 K/uL    MPV 10.2 9.0 - 12.9 fL    Neutrophils-Polys 68.60 44.00 - 72.00 %    Lymphocytes 24.60 22.00 - 41.00 %    Monocytes 5.40 0.00 - 13.40 %    Eosinophils 0.50 0.00 - 6.90 %    Basophils 0.60 0.00 - 1.80 %    Immature Granulocytes 0.30 0.00 - 0.90 %    Nucleated RBC 0.00 /100 WBC    Neutrophils (Absolute) 8.73 (H) 2.00 - 7.15 K/uL    Lymphs (Absolute) 3.13 1.00 - 4.80 K/uL    Monos (Absolute) 0.68 0.00 - 0.85 K/uL    Eos (Absolute) 0.06 0.00 - 0.51 K/uL    Baso (Absolute) 0.07 0.00 - 0.12 K/uL    Immature Granulocytes (abs) 0.04 0.00 - 0.11 K/uL    NRBC (Absolute) 0.00 K/uL   Comp Metabolic Panel   Result Value Ref Range    Sodium 137 135 - 145 mmol/L    Potassium 3.3 (L) 3.6 - 5.5 mmol/L    Chloride 105 96 - 112 mmol/L    Co2 22 20 - 33 mmol/L    Anion Gap 10.0 7.0 - 16.0    Glucose 85 65 - 99 mg/dL    Bun 13 8 - 22 mg/dL    Creatinine 0.62 0.50 - 1.40 mg/dL    Calcium 8.8 8.5 - 10.5 mg/dL    AST(SGOT) 14 12 - 45 U/L    ALT(SGPT) 16 2 - 50 U/L    Alkaline Phosphatase 34 30 - 99 U/L    Total Bilirubin 0.4 0.1 - 1.5 mg/dL    Albumin 4.1 3.2 - 4.9 g/dL    Total Protein 7.3 6.0 - 8.2 g/dL    Globulin 3.2 1.9 - 3.5 g/dL    A-G Ratio 1.3 g/dL   LIPASE   Result Value Ref Range    Lipase 30 11 - 82 U/L   HCG QUANTITATIVE   Result Value Ref Range    Bhcg 2726.0 (H) 0.0 - 5.0 mIU/mL   D-DIMER   Result Value Ref Range    D-Dimer 0.68 (H) 0.00 - 0.50 ug/mL (FEU)   TROPONIN   Result Value Ref Range    Troponin T 6 6 - 19 ng/L   CORRECTED CALCIUM   Result Value Ref Range    Correct Calcium 8.7 8.5 - 10.5 mg/dL   ESTIMATED GFR   Result Value Ref Range    GFR (CKD-EPI) 125 >60 mL/min/1.73 m 2   EKG (NOW)   Result Value Ref Range    Report       [facility] Emergency Dept.    Test Date:  2023-05-14  Pt Name:    MARIAELENA HUMPHRIES                  Department: ER  MRN:        [id number]                      Room:  Gender:     Female                        Technician: [id number]  :        [id number]                   Requested By:ER TRIAGE PROTOCOL  Order #:    [id number]                    Reading MD:    Measurements  Intervals                                Axis  Rate:       84                           P:          59  MD:         147                          QRS:        53  QRSD:       79                           T:          34  QT:         354  QTc:        419    Interpretive Statements  Sinus rhythm  No previous ECG available for comparison        EKG twelve-lead interpretation by me rate 84 sinus rhythm no acute ST segment elevation or depression no pathological T wave inversion no ectopy intervals and axis are normal no suggestion of arrhythmia ischemia or heart block  Protein S, functional activity (%) 65 to 140 57 to 95 42 to 68 16 to 42 25  Tissue plasminogen activator (ng/mL) 1.6 to 13§ 1.8 to 6.0 2.36 to 6.6 3.34 to 9.20 17, 19, 85  Tissue plasminogen activator inhibitor-1 (ng/mL) 4 to 43 16 to 33 36 to 55 67 to 92 17  Activated protein C resistance (APC-r) 2.12 to 5.00 1.79 to 4.75 1.00 to 2.83 1.61 to 5.00 104  D-Dimer (DDU) (ng/mL) <500 200 to 900 200 to 1600 400 to 500     RADIOLOGY  I have independently interpreted the diagnostic imaging associated with this visit and am waiting the final reading from the radiologist.   My preliminary interpretation is as follows: No acute process  Radiologist interpretation:   CT-CTA CHEST PULMONARY ARTERY W/ RECONS   Final Result         Negative CT angiogram of the chest. No evidence of pulmonary embolism.      Fleischner Society pulmonary nodule recommendations:   Not applicable          COURSE & MEDICAL DECISION MAKING    ED Observation Status? Yes; I am placing the patient in to an observation status due to a diagnostic uncertainty as well as therapeutic intensity. Patient placed in observation status at 1:59 PM, 2023.     Observation plan is as follows: Pain extensive laboratory  studies including CBC metabolic panel troponin and D-dimer.  Perform CT angiogram of the chest reviewed plan of care with patient and review results    Upon Reevaluation, the patient's condition has: Improved; and will be discharged.    Patient discharged from ED Observation status at 1700 (Time) 5/14 (Date).     INITIAL ASSESSMENT, COURSE AND PLAN  Care Narrative:    This is a very pleasant 27-year-old female who presents with 1 week of slightly right-sided pleuritic chest discomfort.  Is also worse with movement.  Differential diagnosis was extensive but not exclusive to pulmonary embolism, acute coronary syndrome, pneumothorax aortic dissection esophageal perforation musculoskeletal and/or atypical chest pain.  Patient had reassuring vital signs.  Specifically no high fever hypoxia or tachycardia.  Her physical exam was reassuring and had no reproducible tenderness on palpation on her chest wall but it was slightly worse with movement.  We performed an extensive evaluation today.  Patient had no evidence of lower extremity pain or swelling to suggest DVT.  EKG did not suggest any ischemia and troponin is nondetectable and she had what I would consider atypical chest pain with no cardiovascular risk factors and troponin is not elevated.  D-dimer was slightly elevated.  Please see above reference from up-to-date that normal levels of D-dimer testing are slightly elevated in the first trimester.  Utilization of D-dimer has not been fully validated in pregnancy therefore shared decision-making was utilized with the patient regarding if we would proceed with CT angiogram of the chest.  Patient understood the risks and benefits and would like to proceed with definitive imaging studies which was performed.  CT angiogram of the chest did not demonstrate any evidence of heart strain, pulmonary embolism, or other etiology of chest pain such as pneumothorax pneumonia lung mass esophagitis etc.  I reassured the patient and  advised her that she can take Tylenol for pain.  In regards to her pregnancy her quantitative hCG is only 2700 and she has no lower abdominal pain or bleeding.  We did discuss whether or not we would need to perform a emergent pelvic ultrasound but I think it is reasonable for her to follow-up with her obstetrician as she has an appointment next week.  I counseled her to return as needed for new or worsening symptoms and to take Tylenol for pain      ADDITIONAL PROBLEM LIST    DISPOSITION AND DISCUSSIONS  I have discussed management of the patient with the following physicians and NORA's: None    Discussion of management with other QHP or appropriate source(s): None    Escalation of care considered, and ultimately not performed:acute inpatient care management, however at this time, the patient is most appropriate for outpatient management    Barriers to care at this time, including but not limited to: None none    Decision tools and prescription drugs considered including, but not limited to: D-dimer   .    1. Atypical chest pain        2. Less than 8 weeks gestation of pregnancy                 Electronically signed by: Houston Hale M.D., 5/14/2023 1:58 PM

## 2023-05-14 NOTE — ED TRIAGE NOTES
Pt ambulated to triage with   Chief Complaint   Patient presents with    Chest Pain     For the last wk and reports that she is 6 wks pregnant.  Denies vaginal bleeding or abd pain.  Chest pain to the left side of chest around her left breast, pain with deep inspiration, sharp pain.       Pt Informed regarding triage process and verbalized understanding to inform triage tech or RN for any changes in condition. Placed in lobby.

## 2024-01-14 ENCOUNTER — ANESTHESIA EVENT (OUTPATIENT)
Dept: ANESTHESIOLOGY | Facility: MEDICAL CENTER | Age: 29
End: 2024-01-14
Payer: COMMERCIAL

## 2024-01-14 ENCOUNTER — ANESTHESIA (OUTPATIENT)
Dept: ANESTHESIOLOGY | Facility: MEDICAL CENTER | Age: 29
End: 2024-01-14
Payer: COMMERCIAL

## 2024-01-14 ENCOUNTER — APPOINTMENT (OUTPATIENT)
Dept: OBGYN | Facility: MEDICAL CENTER | Age: 29
End: 2024-01-14
Attending: OBSTETRICS & GYNECOLOGY
Payer: COMMERCIAL

## 2024-01-14 ENCOUNTER — HOSPITAL ENCOUNTER (INPATIENT)
Facility: MEDICAL CENTER | Age: 29
LOS: 2 days | End: 2024-01-16
Attending: OBSTETRICS & GYNECOLOGY | Admitting: OBSTETRICS & GYNECOLOGY
Payer: COMMERCIAL

## 2024-01-14 LAB
BASOPHILS # BLD AUTO: 0.4 % (ref 0–1.8)
BASOPHILS # BLD: 0.03 K/UL (ref 0–0.12)
EOSINOPHIL # BLD AUTO: 0.05 K/UL (ref 0–0.51)
EOSINOPHIL NFR BLD: 0.6 % (ref 0–6.9)
ERYTHROCYTE [DISTWIDTH] IN BLOOD BY AUTOMATED COUNT: 44.2 FL (ref 35.9–50)
HCT VFR BLD AUTO: 36 % (ref 37–47)
HGB BLD-MCNC: 11.5 G/DL (ref 12–16)
HOLDING TUBE BB 8507: NORMAL
IMM GRANULOCYTES # BLD AUTO: 0.02 K/UL (ref 0–0.11)
IMM GRANULOCYTES NFR BLD AUTO: 0.2 % (ref 0–0.9)
LYMPHOCYTES # BLD AUTO: 2.1 K/UL (ref 1–4.8)
LYMPHOCYTES NFR BLD: 24.8 % (ref 22–41)
MCH RBC QN AUTO: 27.8 PG (ref 27–33)
MCHC RBC AUTO-ENTMCNC: 31.9 G/DL (ref 32.2–35.5)
MCV RBC AUTO: 87.2 FL (ref 81.4–97.8)
MONOCYTES # BLD AUTO: 0.7 K/UL (ref 0–0.85)
MONOCYTES NFR BLD AUTO: 8.3 % (ref 0–13.4)
NEUTROPHILS # BLD AUTO: 5.57 K/UL (ref 1.82–7.42)
NEUTROPHILS NFR BLD: 65.7 % (ref 44–72)
NRBC # BLD AUTO: 0 K/UL
NRBC BLD-RTO: 0 /100 WBC (ref 0–0.2)
PLATELET # BLD AUTO: 187 K/UL (ref 164–446)
PMV BLD AUTO: 12 FL (ref 9–12.9)
RBC # BLD AUTO: 4.13 M/UL (ref 4.2–5.4)
T PALLIDUM AB SER QL IA: NORMAL
WBC # BLD AUTO: 8.5 K/UL (ref 4.8–10.8)

## 2024-01-14 PROCEDURE — 3E033VJ INTRODUCTION OF OTHER HORMONE INTO PERIPHERAL VEIN, PERCUTANEOUS APPROACH: ICD-10-PCS | Performed by: OBSTETRICS & GYNECOLOGY

## 2024-01-14 PROCEDURE — 10907ZC DRAINAGE OF AMNIOTIC FLUID, THERAPEUTIC FROM PRODUCTS OF CONCEPTION, VIA NATURAL OR ARTIFICIAL OPENING: ICD-10-PCS | Performed by: OBSTETRICS & GYNECOLOGY

## 2024-01-14 PROCEDURE — 10D17Z9 MANUAL EXTRACTION OF PRODUCTS OF CONCEPTION, RETAINED, VIA NATURAL OR ARTIFICIAL OPENING: ICD-10-PCS | Performed by: OBSTETRICS & GYNECOLOGY

## 2024-01-14 PROCEDURE — 700105 HCHG RX REV CODE 258: Performed by: OBSTETRICS & GYNECOLOGY

## 2024-01-14 PROCEDURE — 700111 HCHG RX REV CODE 636 W/ 250 OVERRIDE (IP): Performed by: OBSTETRICS & GYNECOLOGY

## 2024-01-14 PROCEDURE — 85025 COMPLETE CBC W/AUTO DIFF WBC: CPT

## 2024-01-14 PROCEDURE — 700102 HCHG RX REV CODE 250 W/ 637 OVERRIDE(OP): Performed by: OBSTETRICS & GYNECOLOGY

## 2024-01-14 PROCEDURE — 86780 TREPONEMA PALLIDUM: CPT

## 2024-01-14 PROCEDURE — 700101 HCHG RX REV CODE 250: Performed by: STUDENT IN AN ORGANIZED HEALTH CARE EDUCATION/TRAINING PROGRAM

## 2024-01-14 PROCEDURE — A9270 NON-COVERED ITEM OR SERVICE: HCPCS | Performed by: OBSTETRICS & GYNECOLOGY

## 2024-01-14 PROCEDURE — 86901 BLOOD TYPING SEROLOGIC RH(D): CPT

## 2024-01-14 PROCEDURE — 59409 OBSTETRICAL CARE: CPT

## 2024-01-14 PROCEDURE — 36415 COLL VENOUS BLD VENIPUNCTURE: CPT

## 2024-01-14 PROCEDURE — 700111 HCHG RX REV CODE 636 W/ 250 OVERRIDE (IP): Mod: JZ

## 2024-01-14 PROCEDURE — 770002 HCHG ROOM/CARE - OB PRIVATE (112)

## 2024-01-14 PROCEDURE — 304965 HCHG RECOVERY SERVICES

## 2024-01-14 PROCEDURE — 303615 HCHG EPIDURAL/SPINAL ANESTHESIA FOR LABOR

## 2024-01-14 PROCEDURE — 700111 HCHG RX REV CODE 636 W/ 250 OVERRIDE (IP): Performed by: STUDENT IN AN ORGANIZED HEALTH CARE EDUCATION/TRAINING PROGRAM

## 2024-01-14 PROCEDURE — 10H07YZ INSERTION OF OTHER DEVICE INTO PRODUCTS OF CONCEPTION, VIA NATURAL OR ARTIFICIAL OPENING: ICD-10-PCS | Performed by: OBSTETRICS & GYNECOLOGY

## 2024-01-14 RX ORDER — ONDANSETRON 4 MG/1
4 TABLET, ORALLY DISINTEGRATING ORAL EVERY 6 HOURS PRN
Status: DISCONTINUED | OUTPATIENT
Start: 2024-01-14 | End: 2024-01-14 | Stop reason: HOSPADM

## 2024-01-14 RX ORDER — ONDANSETRON 2 MG/ML
4 INJECTION INTRAMUSCULAR; INTRAVENOUS EVERY 6 HOURS PRN
Status: DISCONTINUED | OUTPATIENT
Start: 2024-01-14 | End: 2024-01-14 | Stop reason: HOSPADM

## 2024-01-14 RX ORDER — MISOPROSTOL 200 UG/1
1000 TABLET ORAL
Status: COMPLETED | OUTPATIENT
Start: 2024-01-14 | End: 2024-01-14

## 2024-01-14 RX ORDER — SODIUM CHLORIDE, SODIUM LACTATE, POTASSIUM CHLORIDE, AND CALCIUM CHLORIDE .6; .31; .03; .02 G/100ML; G/100ML; G/100ML; G/100ML
250 INJECTION, SOLUTION INTRAVENOUS PRN
Status: DISCONTINUED | OUTPATIENT
Start: 2024-01-14 | End: 2024-01-14 | Stop reason: HOSPADM

## 2024-01-14 RX ORDER — SODIUM CHLORIDE, SODIUM LACTATE, POTASSIUM CHLORIDE, CALCIUM CHLORIDE 600; 310; 30; 20 MG/100ML; MG/100ML; MG/100ML; MG/100ML
INJECTION, SOLUTION INTRAVENOUS CONTINUOUS
Status: DISCONTINUED | OUTPATIENT
Start: 2024-01-14 | End: 2024-01-16 | Stop reason: HOSPADM

## 2024-01-14 RX ORDER — ROPIVACAINE HYDROCHLORIDE 2 MG/ML
INJECTION, SOLUTION EPIDURAL; INFILTRATION
Status: COMPLETED | OUTPATIENT
Start: 2024-01-14 | End: 2024-01-14

## 2024-01-14 RX ORDER — EPHEDRINE SULFATE 50 MG/ML
5 INJECTION, SOLUTION INTRAVENOUS
Status: DISCONTINUED | OUTPATIENT
Start: 2024-01-14 | End: 2024-01-14 | Stop reason: HOSPADM

## 2024-01-14 RX ORDER — OXYTOCIN 10 [USP'U]/ML
10 INJECTION, SOLUTION INTRAMUSCULAR; INTRAVENOUS
Status: DISCONTINUED | OUTPATIENT
Start: 2024-01-14 | End: 2024-01-14 | Stop reason: HOSPADM

## 2024-01-14 RX ORDER — SODIUM CHLORIDE, SODIUM LACTATE, POTASSIUM CHLORIDE, AND CALCIUM CHLORIDE .6; .31; .03; .02 G/100ML; G/100ML; G/100ML; G/100ML
1000 INJECTION, SOLUTION INTRAVENOUS
Status: DISCONTINUED | OUTPATIENT
Start: 2024-01-14 | End: 2024-01-14 | Stop reason: HOSPADM

## 2024-01-14 RX ORDER — ROPIVACAINE HYDROCHLORIDE 2 MG/ML
INJECTION, SOLUTION EPIDURAL; INFILTRATION; PERINEURAL
Status: COMPLETED
Start: 2024-01-14 | End: 2024-01-14

## 2024-01-14 RX ORDER — LIDOCAINE HYDROCHLORIDE 10 MG/ML
20 INJECTION, SOLUTION INFILTRATION; PERINEURAL
Status: DISCONTINUED | OUTPATIENT
Start: 2024-01-14 | End: 2024-01-14 | Stop reason: HOSPADM

## 2024-01-14 RX ORDER — ROPIVACAINE HYDROCHLORIDE 2 MG/ML
INJECTION, SOLUTION EPIDURAL; INFILTRATION; PERINEURAL CONTINUOUS
Status: DISCONTINUED | OUTPATIENT
Start: 2024-01-14 | End: 2024-01-16 | Stop reason: HOSPADM

## 2024-01-14 RX ORDER — CITRIC ACID/SODIUM CITRATE 334-500MG
30 SOLUTION, ORAL ORAL EVERY 6 HOURS PRN
Status: DISCONTINUED | OUTPATIENT
Start: 2024-01-14 | End: 2024-01-14 | Stop reason: HOSPADM

## 2024-01-14 RX ORDER — LIDOCAINE HYDROCHLORIDE AND EPINEPHRINE 15; 5 MG/ML; UG/ML
INJECTION, SOLUTION EPIDURAL
Status: COMPLETED | OUTPATIENT
Start: 2024-01-14 | End: 2024-01-14

## 2024-01-14 RX ORDER — ACETAMINOPHEN 500 MG
1000 TABLET ORAL
Status: DISCONTINUED | OUTPATIENT
Start: 2024-01-14 | End: 2024-01-14 | Stop reason: HOSPADM

## 2024-01-14 RX ORDER — IBUPROFEN 800 MG/1
800 TABLET ORAL
Status: DISCONTINUED | OUTPATIENT
Start: 2024-01-14 | End: 2024-01-14 | Stop reason: HOSPADM

## 2024-01-14 RX ORDER — TERBUTALINE SULFATE 1 MG/ML
0.25 INJECTION, SOLUTION SUBCUTANEOUS
Status: DISCONTINUED | OUTPATIENT
Start: 2024-01-14 | End: 2024-01-14 | Stop reason: HOSPADM

## 2024-01-14 RX ADMIN — ROPIVACAINE HYDROCHLORIDE 200 MG: 2 INJECTION, SOLUTION EPIDURAL; INFILTRATION at 18:29

## 2024-01-14 RX ADMIN — ROPIVACAINE HYDROCHLORIDE 5 ML: 5 INJECTION, SOLUTION EPIDURAL; INFILTRATION; PERINEURAL at 18:33

## 2024-01-14 RX ADMIN — MISOPROSTOL 1000 MCG: 200 TABLET ORAL at 22:03

## 2024-01-14 RX ADMIN — OXYTOCIN 20 UNITS: 10 INJECTION, SOLUTION INTRAMUSCULAR; INTRAVENOUS at 22:02

## 2024-01-14 RX ADMIN — ROPIVACAINE HYDROCHLORIDE 200 MG: 2 INJECTION, SOLUTION EPIDURAL; INFILTRATION; PERINEURAL at 18:29

## 2024-01-14 RX ADMIN — SODIUM CHLORIDE, POTASSIUM CHLORIDE, SODIUM LACTATE AND CALCIUM CHLORIDE: 600; 310; 30; 20 INJECTION, SOLUTION INTRAVENOUS at 14:25

## 2024-01-14 RX ADMIN — LIDOCAINE HYDROCHLORIDE,EPINEPHRINE BITARTRATE 5 ML: 15; .005 INJECTION, SOLUTION EPIDURAL; INFILTRATION; INTRACAUDAL; PERINEURAL at 18:28

## 2024-01-14 RX ADMIN — OXYTOCIN 1 MILLI-UNITS/MIN: 10 INJECTION, SOLUTION INTRAMUSCULAR; INTRAVENOUS at 14:28

## 2024-01-14 ASSESSMENT — PAIN SCALES - GENERAL: PAIN_LEVEL: 5

## 2024-01-14 ASSESSMENT — LIFESTYLE VARIABLES: EVER_SMOKED: YES

## 2024-01-14 ASSESSMENT — FIBROSIS 4 INDEX: FIB4 SCORE: 0.38

## 2024-01-14 ASSESSMENT — PATIENT HEALTH QUESTIONNAIRE - PHQ9
SUM OF ALL RESPONSES TO PHQ9 QUESTIONS 1 AND 2: 0
2. FEELING DOWN, DEPRESSED, IRRITABLE, OR HOPELESS: NOT AT ALL
1. LITTLE INTEREST OR PLEASURE IN DOING THINGS: NOT AT ALL

## 2024-01-14 NOTE — PROGRESS NOTES
28 y.o  EDC 24 (40w1d)    Pt arrives to Labor and Delivery for scheduled elective IOL. Pt states +FM, denies LOF/VB/ctx. VSS. Monitors applied x2.  Zaynab ANDERSON updated on pt status. Orders received. POC discussed. All questions and concerns addressed at this time.     Gertrudis ANDERSON at bedside for epidural placement. Consent signed. Time out called, all agree. Test dose negative. Pt tolerated well.     Valencia catheter placed with sterile technique. Urine return noted. SVE 4/80/-2.      Zaynab ANDERSON at bedside. AROM clear fluid. SVE 4/80/-2. IUPC placed.     SVE 10/100/+2. Zaynab ANDERSON updated. MD to come to bedside.      of viable male. APGARs 8/9. EBL 300mL.    2345 Pt up to bathroom, steady on feet. Able to void.    0000 Pt up to PP in stable condition. Report given to Jed SAGE.

## 2024-01-14 NOTE — CARE PLAN
Problem: Knowledge Deficit - L&D  Goal: Patient and family/caregivers will demonstrate understanding of plan of care, disease process/condition, diagnostic tests and medications  Outcome: Progressing     Problem: Risk for Infection and Impaired Wound Healing  Goal: Patient will remain free from infection  Outcome: Progressing     Problem: Pain  Goal: Patient's pain will be alleviated or reduced to the patient’s comfort goal  Outcome: Progressing     The patient is Stable - Low risk of patient condition declining or worsening         Progress made toward(s) clinical / shift goals:  Make cervical change    Patient is not progressing towards the following goals:

## 2024-01-15 LAB
ERYTHROCYTE [DISTWIDTH] IN BLOOD BY AUTOMATED COUNT: 44.9 FL (ref 35.9–50)
HCT VFR BLD AUTO: 35.4 % (ref 37–47)
HGB BLD-MCNC: 11.6 G/DL (ref 12–16)
MCH RBC QN AUTO: 28.6 PG (ref 27–33)
MCHC RBC AUTO-ENTMCNC: 32.8 G/DL (ref 32.2–35.5)
MCV RBC AUTO: 87.2 FL (ref 81.4–97.8)
NUMBER OF RH DOSES IND 8505RD: NORMAL
PLATELET # BLD AUTO: 153 K/UL (ref 164–446)
PMV BLD AUTO: 11.6 FL (ref 9–12.9)
RBC # BLD AUTO: 4.06 M/UL (ref 4.2–5.4)
RH BLD: NORMAL
WBC # BLD AUTO: 12 K/UL (ref 4.8–10.8)

## 2024-01-15 PROCEDURE — 36415 COLL VENOUS BLD VENIPUNCTURE: CPT

## 2024-01-15 PROCEDURE — 85027 COMPLETE CBC AUTOMATED: CPT

## 2024-01-15 PROCEDURE — 770002 HCHG ROOM/CARE - OB PRIVATE (112)

## 2024-01-15 PROCEDURE — A9270 NON-COVERED ITEM OR SERVICE: HCPCS | Performed by: OBSTETRICS & GYNECOLOGY

## 2024-01-15 PROCEDURE — 700111 HCHG RX REV CODE 636 W/ 250 OVERRIDE (IP): Performed by: OBSTETRICS & GYNECOLOGY

## 2024-01-15 PROCEDURE — 700102 HCHG RX REV CODE 250 W/ 637 OVERRIDE(OP): Performed by: OBSTETRICS & GYNECOLOGY

## 2024-01-15 PROCEDURE — 700105 HCHG RX REV CODE 258: Performed by: OBSTETRICS & GYNECOLOGY

## 2024-01-15 RX ORDER — DOCUSATE SODIUM 100 MG/1
100 CAPSULE, LIQUID FILLED ORAL 2 TIMES DAILY PRN
Status: DISCONTINUED | OUTPATIENT
Start: 2024-01-15 | End: 2024-01-16 | Stop reason: HOSPADM

## 2024-01-15 RX ORDER — OXYCODONE HYDROCHLORIDE 5 MG/1
5 TABLET ORAL EVERY 4 HOURS PRN
Status: DISCONTINUED | OUTPATIENT
Start: 2024-01-15 | End: 2024-01-16 | Stop reason: HOSPADM

## 2024-01-15 RX ORDER — ACETAMINOPHEN 500 MG
1000 TABLET ORAL EVERY 6 HOURS PRN
Status: DISCONTINUED | OUTPATIENT
Start: 2024-01-15 | End: 2024-01-16 | Stop reason: HOSPADM

## 2024-01-15 RX ORDER — SIMETHICONE 125 MG
125 TABLET,CHEWABLE ORAL 4 TIMES DAILY PRN
Status: DISCONTINUED | OUTPATIENT
Start: 2024-01-15 | End: 2024-01-16 | Stop reason: HOSPADM

## 2024-01-15 RX ORDER — SODIUM CHLORIDE, SODIUM LACTATE, POTASSIUM CHLORIDE, CALCIUM CHLORIDE 600; 310; 30; 20 MG/100ML; MG/100ML; MG/100ML; MG/100ML
INJECTION, SOLUTION INTRAVENOUS PRN
Status: DISCONTINUED | OUTPATIENT
Start: 2024-01-15 | End: 2024-01-16 | Stop reason: HOSPADM

## 2024-01-15 RX ORDER — METHYLERGONOVINE MALEATE 0.2 MG/ML
0.2 INJECTION INTRAVENOUS
Status: DISCONTINUED | OUTPATIENT
Start: 2024-01-15 | End: 2024-01-16 | Stop reason: HOSPADM

## 2024-01-15 RX ORDER — VITAMIN A ACETATE, BETA CAROTENE, ASCORBIC ACID, CHOLECALCIFEROL, .ALPHA.-TOCOPHEROL ACETATE, DL-, THIAMINE MONONITRATE, RIBOFLAVIN, NIACINAMIDE, PYRIDOXINE HYDROCHLORIDE, FOLIC ACID, CYANOCOBALAMIN, CALCIUM CARBONATE, FERROUS FUMARATE, ZINC OXIDE, CUPRIC OXIDE 3080; 12; 120; 400; 1; 1.84; 3; 20; 22; 920; 25; 200; 27; 10; 2 [IU]/1; UG/1; MG/1; [IU]/1; MG/1; MG/1; MG/1; MG/1; MG/1; [IU]/1; MG/1; MG/1; MG/1; MG/1; MG/1
1 TABLET, FILM COATED ORAL
Status: DISCONTINUED | OUTPATIENT
Start: 2024-01-15 | End: 2024-01-16 | Stop reason: HOSPADM

## 2024-01-15 RX ORDER — IBUPROFEN 800 MG/1
800 TABLET ORAL EVERY 8 HOURS PRN
Status: DISCONTINUED | OUTPATIENT
Start: 2024-01-15 | End: 2024-01-16 | Stop reason: HOSPADM

## 2024-01-15 RX ADMIN — ACETAMINOPHEN 1000 MG: 500 TABLET ORAL at 08:14

## 2024-01-15 RX ADMIN — CEFAZOLIN 2 G: 2 INJECTION, POWDER, FOR SOLUTION INTRAMUSCULAR; INTRAVENOUS at 05:52

## 2024-01-15 RX ADMIN — IBUPROFEN 800 MG: 800 TABLET, FILM COATED ORAL at 10:53

## 2024-01-15 RX ADMIN — CEFAZOLIN 2 G: 2 INJECTION, POWDER, FOR SOLUTION INTRAMUSCULAR; INTRAVENOUS at 14:22

## 2024-01-15 RX ADMIN — ACETAMINOPHEN 1000 MG: 500 TABLET ORAL at 23:10

## 2024-01-15 RX ADMIN — SODIUM CHLORIDE, POTASSIUM CHLORIDE, SODIUM LACTATE AND CALCIUM CHLORIDE: 600; 310; 30; 20 INJECTION, SOLUTION INTRAVENOUS at 00:01

## 2024-01-15 RX ADMIN — IBUPROFEN 800 MG: 800 TABLET, FILM COATED ORAL at 00:38

## 2024-01-15 RX ADMIN — PRENATAL WITH FERROUS FUM AND FOLIC ACID 1 TABLET: 3080; 920; 120; 400; 22; 1.84; 3; 20; 10; 1; 12; 200; 27; 25; 2 TABLET ORAL at 08:14

## 2024-01-15 RX ADMIN — CEFAZOLIN 2 G: 2 INJECTION, POWDER, FOR SOLUTION INTRAMUSCULAR; INTRAVENOUS at 00:02

## 2024-01-15 RX ADMIN — OXYTOCIN 125 ML/HR: 10 INJECTION, SOLUTION INTRAMUSCULAR; INTRAVENOUS at 00:03

## 2024-01-15 ASSESSMENT — PAIN DESCRIPTION - PAIN TYPE
TYPE: ACUTE PAIN

## 2024-01-15 NOTE — L&D DELIVERY NOTE
LABOR AND DELIVERY    DELIVERY SUMMARY    STAGE I LABOR:    The patient was admitted as a 27 yo  at 40w1d based upon an 8w4d u/s performed on 2023 and who presented for scheduled induction of labor. She is GBS negative. She was noted to be 3/60%/-2.  She was started on IV pitocin per protocol.  She received an epidural for labor analgesia.  She underwent AROM clear fluid at 1948.  She progressed to completely dilated at 2148.  Category I tracing during stage I labor.    STAGE II LABOR: 9 minutes    I was present for a  of a viable male infant sy 2157.  The vertex of the infant delivered without difficulty.  A nuchal cord was palpated and reduced.  The remainder of the infant delivered without difficulty.  No shoulder dystocia was encountered.  The infant was placed on the mother's abdomen.  The infant's mouth and nose were bulb suctioned and after 2 minutes of delayed cord clamping, the cord was clamped and cut.  A segment of cord was clamped and cut for a cord gas but this was held as the APGAR scores were 8 at one minute and 9 at five minutes.    The infant weight is pending.    The patient received IV pitocin after delivery of the infant.    STAGE III LABOR: 9 MINUTES    Despite active management of the third stage of labor, with fundal massage, gentle cord traction, and IV pitocin, the placenta was fundal and demonstrated no movement.  Discussed the need for manual extraction.  After gentle manual extraction, the placenta delivered intact with a 3-vessel cord at 2206.    Bimanual uterine massage and exploration performed and the fundus was firm 1 cm below the umbilicus.  No clots noted.    The patient's perineum was examined and found to be intact.    EBL - 300 cc

## 2024-01-15 NOTE — PROGRESS NOTES
"IUP at 40w1d.  GBS negative.  IOL    S:  Doing well.  Comfortable after epidural.  Discussed need to check cervix and patient agrees to undergo AROM with placement of IUPC.  Questions answered.    O:  /61   Pulse (!) 103   Temp 36.3 °C (97.3 °F) (Temporal)   Resp 18   Ht 1.626 m (5' 4\")   Wt 85.3 kg (188 lb)   SpO2 93%     A, A, and O x 3 NAD    Gravid    Vertex    EFM: category I tracing.  Reactive and without decelerations.    TOCO: every 2-3 minutes    SVE: 4/80%/-1    AROM clear fluid at 1950.  IUPC placed.    A/P: IUP at 40w1d.  IOL.  GBS negative.     AROM clear fluid.  IUPC placed.  Continue IV pitocin.  Anticipate .  "

## 2024-01-15 NOTE — H&P
"ADMISSION H AND P (DICTATED)    ADMISSION DIAGNOSIS:     IUP at 40w1d.  Induction of labor.  GBS negative.  Favorable cervix.  Maternal discomfort with contractions.    Plan:     Admit to L and D.  Begin IV pitocin.    Ok for epidural.  Anticipate .      Recent Labs     24  1308   WBC 8.5   RBC 4.13*   HEMOGLOBIN 11.5*   HEMATOCRIT 36.0*   MCV 87.2   MCH 27.8   RDW 44.2   PLATELETCT 187   MPV 12.0   NEUTSPOLYS 65.70   LYMPHOCYTES 24.80   MONOCYTES 8.30   EOSINOPHILS 0.60   BASOPHILS 0.40     /78   Pulse 90   Temp 36.3 °C (97.3 °F) (Temporal)   Resp 18   Ht 1.626 m (5' 4\")   Wt 85.3 kg (188 lb)     A, A, and O x 3 NAD    Gravid    Vertex    EFW - 3200 grams    EFM: category I tracing. Reactive and without decelerations.    TOCO: now every 2-3 minutes    SVE: per RN 3/60%/-2    A/P: 29 yo  at 40w1d who presents for scheduled IOL secondary to maternal discomfort with contractions.      Admit to L and D.  Begin IV pitocin for labor augmentation.  Ok for epidural.  AROM after epidural.  Anticipate .  "

## 2024-01-15 NOTE — ANESTHESIA PROCEDURE NOTES
Epidural Block    Date/Time: 1/14/2024 6:27 PM    Performed by: Negrito Caba M.D.  Authorized by: Negrito Caba M.D.    Patient Location:  OB  Start Time:  1/14/2024 6:27 PM  Reason for Block: labor analgesia    patient identified, IV checked, site marked, risks and benefits discussed, surgical consent, monitors and equipment checked and pre-op evaluation    Patient Position:  Sitting  Prep: ChloraPrep, patient draped and sterile technique    Monitoring:  Blood pressure, continuous pulse oximetry and heart rate  Approach:  Midline  Location:  L3-L4  Injection Technique:  KYARA air  Skin infiltration:  Lidocaine  Strength:  1%  Dose:  3ml  Needle Type:  Tuohy  Needle Gauge:  17 G  Needle Length:  3.5 in  Loss of resistance::  7  Catheter Size:  19 G  Catheter at Skin Depth:  12  Test Dose Result:  Negative   1 attempt, - heme, - csf, DPE 25g Jhony, + csf, - test dose

## 2024-01-15 NOTE — ANESTHESIA POSTPROCEDURE EVALUATION
Patient: Vika Alvarez    Procedure Summary       Date: 01/14/24 Room / Location:     Anesthesia Start: 1816 Anesthesia Stop: 2157    Procedure: Labor Epidural Diagnosis:     Scheduled Providers:  Responsible Provider: Negrito Caba M.D.    Anesthesia Type: epidural ASA Status: 2            Final Anesthesia Type: epidural  Last vitals  BP   Blood Pressure: 120/70    Temp   36.3 °C (97.3 °F)    Pulse   85   Resp   18    SpO2   93 %      Anesthesia Post Evaluation    Patient location during evaluation: PACU  Patient participation: complete - patient participated  Level of consciousness: awake and alert  Pain score: 5    Airway patency: patent  Anesthetic complications: no  Cardiovascular status: hemodynamically stable  Respiratory status: acceptable  Hydration status: euvolemic    PONV: none          No notable events documented.     Nurse Pain Score: 5 (NPRS)

## 2024-01-15 NOTE — PROGRESS NOTES
0815: Assessment complete. Plan of care discussed with patient. Patient encouraged to call with any needs.

## 2024-01-15 NOTE — CARE PLAN
The patient is Stable - Low risk of patient condition declining or worsening    Shift Goals  Clinical Goals: Pain management, normal lochia    Progress made toward(s) clinical / shift goals:  Patient reports adequate pain management, will request PRN medication if needed. Bleeding is light, reviewed normal and expected changes and rosaline care.    Patient is not progressing towards the following goals:

## 2024-01-15 NOTE — H&P
DATE OF ADMISSION:  2024     ADMISSION HISTORY AND PHYSICAL     ADMISSION DIAGNOSES:    1.  Intrauterine pregnancy at 40+1 weeks' gestation.  2.  GBS negative.  3.  Rh negative.  4.  Favorable cervix.  5.  Maternal discomfort with contractions.  6.  Induction of labor.     HISTORY OF PRESENT ILLNESS:  The patient is a 28-year-old  2, para   1-0-0-1 at 40+1 weeks' gestation based upon an 8+4 week ultrasound performed   on 2023, who presents for scheduled induction of labor.  At the time of   admission, her cervix was 3 cm, 60% effaced, -2 station.  She was abel   every 8-10 minutes.  The patient is noted to be GBS negative.  She will be   started on IV Pitocin and may have an epidural for labor analgesia.     Prenatal care with Dr. Jovita Worthy, first visit on 2023.  Total visits   14, total weight gain 27 pounds.  Third trimester blood pressures   116-131/69-87.     PRENATAL LABS:  GBS negative on 2023.  Blood type A negative, antibody   screen negative, rubella immune, RPR nonreactive, hepatitis B surface antigen   negative.  Hep C viral antibody nonreactive, HIV nonreactive.  Pap negative   for intraepithelial lesion or malignancy.  GC chlamydia negative, negative.    One-hour GCT 97.  Repeat RPR is nonreactive.  The patient is status post   RhoGAM on 10/20/2023 at 27+6 weeks' gestation.     PAST OBSTETRIC HISTORY:   x1 on 2019 at 39 weeks' gestation, 6 pound   9 ounces.     OBSTETRIC ULTRASOUND:    1.  On 2023 at 8+4 weeks' gestation, RADHA 2024.  2.  2023 at 13+1 week gestation, RADHA 2024.  3.  2023 at 22+3 weeks' gestation, RADHA 2024.  Estimated fetal   weight 1 pound 2 ounces.  Fetal heart rate 144 beats per minute.  Breech   presentation.  Fundal grade I placenta, no previa.  Cervix 3.78 cm.  The   patient does not want to know the gender.  4.  On 10/19/2023 at 28+5 weeks' gestation.  77th percentile for growth.    Fetal heart  rate 126 beats per minute.  Breech presentation.  CHELSEA 15.07.  5.  2023 single viable fetus in vertex presentation.  Estimated   gestational age 36 weeks 3 days.  RADHA 2023.  Estimated fetal weight 6   pounds 11 ounces.  3027 grams.  53rd percentile.     PAST MEDICAL HISTORY:  She does have a history of Bell's palsy during this   pregnancy, on no medications.     PAST SURGICAL HISTORY:  None.     ALLERGIES:  No known drug allergies.     ISSUES THIS PREGNANCY:  1.  Rh negative status.  2.  Bell's palsy, saw her primary MD. and the Bell's palsy resolved on its   own.  3.  Carpal tunnel.     ALLERGIES:  No known drug allergies.     FAMILY HISTORY:  Noncontributory.     REVIEW OF SYSTEMS:  Negative.     PHYSICAL EXAMINATION:    VITAL SIGNS:  On admission, blood pressure 127/78, pulse 90, temperature 97.3,   respiratory rate 18.  GENERAL:  Alert, awake and oriented x3, in no acute distress.  ABDOMEN:  Gravid, vertex by Leopold's, estimated fetal weight 3200 grams.    External fetal monitoring category 1 tracing, reactive, without decelerations.    [place], she is abel initially every 8-10 minutes and after IV Pitocin   was started she is not abel every 3-4 minutes.  PELVIC:  Sterile vaginal exam upon admission ____.     LABORATORY DATA:  On admission, white count 8.5, hemoglobin 11.5, hematocrit   36, platelets 187.     ASSESSMENT AND PLAN:  A 28-year-old  2, para 1-0-0-1 at 40+1 weeks'   gestation based upon an 8+4 week ultrasound performed on 2023, who   presents to labor and delivery for scheduled induction of labor, who is GBS   negative with a favorable cervix, who is Rh negative and status post RhoGAM.     The patient will be admitted to labor and delivery.  She may have an epidural   for labor analgesia.  She will be continued on IV Pitocin per protocol for   labor augmentation and we will anticipate a normal spontaneous vaginal   delivery.            ______________________________  MD SHARYN Jara/REECE    DD:  01/14/2024 17:52  DT:  01/14/2024 18:53    Job#:  [id number]

## 2024-01-15 NOTE — PROGRESS NOTES
"PPD #1 s/p     S:  Doing well.  Working on breast feeding.  She has moderate lochia.  Her pain is well controlled.  She prefers to stay today/tonight and be discharged in the morning of 2024.  She will be continued on IV antibiotics for 24 hours secondary to the manual extraction of the retained placenta.  She denies nausea/vomiting/fevers/chills/night sweats.    O:  /65   Pulse 72   Temp 36.7 °C (98.1 °F) (Temporal)   Resp 18   Ht 1.626 m (5' 4\")   Wt 85.3 kg (188 lb)   SpO2 98%     A, A, and O x 3 NAD    FF u/1    No c/c/e    Recent Labs     24  1308   WBC 8.5   RBC 4.13*   HEMOGLOBIN 11.5*   HEMATOCRIT 36.0*   MCV 87.2   MCH 27.8   RDW 44.2   PLATELETCT 187   MPV 12.0   NEUTSPOLYS 65.70   LYMPHOCYTES 24.80   MONOCYTES 8.30   EOSINOPHILS 0.60   BASOPHILS 0.40     CBC pending this AM of 1/15/2024.    A/P: PPD #1 s/p  without lacerations but with a manual extraction for a fundal/adherent retained placenta.     Ambulate TID.  ADAT.  Work on breast feeding.  Continue Ancef IV x 24 hours.  Anticipate D/C tomorrow AM.  Continue cares.  CBC with diff/platelets pending.  "

## 2024-01-15 NOTE — CARE PLAN
Problem: Knowledge Deficit - Postpartum  Goal: Patient will verbalize and demonstrate understanding of self and infant care  Outcome: Progressing     Problem: Altered Physiologic Condition  Goal: Patient physiologically stable as evidenced by normal lochia, palpable uterine involution and vitals within normal limits  Outcome: Progressing   The patient is Stable - Low risk of patient condition declining or worsening    Shift Goals  Clinical Goals: VSS    Progress made toward(s) clinical / shift goals:  Patient is ambulating and preforming self care and care of infant. Patient vital signs WDL.

## 2024-01-15 NOTE — ANESTHESIA PREPROCEDURE EVALUATION
Date: 01/14/24  Procedure: Labor Epidural         Relevant Problems   No relevant active problems       Physical Exam    Airway   Mallampati: II  TM distance: >3 FB  Neck ROM: full       Cardiovascular - normal exam  Rhythm: regular  Rate: normal  (-) murmur     Dental - normal exam           Pulmonary - normal exam  Breath sounds clear to auscultation     Abdominal    Neurological - normal exam                   Anesthesia Plan    ASA 2       Plan - epidural   Neuraxial block will be labor analgesia                  Pertinent diagnostic labs and testing reviewed    Informed Consent:    Anesthetic plan and risks discussed with patient.

## 2024-01-16 ENCOUNTER — HOSPITAL ENCOUNTER (OUTPATIENT)
Facility: MEDICAL CENTER | Age: 29
End: 2024-01-18
Attending: OBSTETRICS & GYNECOLOGY | Admitting: OBSTETRICS & GYNECOLOGY
Payer: COMMERCIAL

## 2024-01-16 ENCOUNTER — LACTATION ENCOUNTER (OUTPATIENT)
Dept: NURSERY | Facility: MEDICAL CENTER | Age: 29
End: 2024-01-16

## 2024-01-16 ENCOUNTER — APPOINTMENT (OUTPATIENT)
Dept: RADIOLOGY | Facility: MEDICAL CENTER | Age: 29
End: 2024-01-16
Attending: OBSTETRICS & GYNECOLOGY
Payer: COMMERCIAL

## 2024-01-16 VITALS
TEMPERATURE: 98.3 F | HEIGHT: 64 IN | DIASTOLIC BLOOD PRESSURE: 73 MMHG | WEIGHT: 188 LBS | SYSTOLIC BLOOD PRESSURE: 114 MMHG | BODY MASS INDEX: 32.1 KG/M2 | HEART RATE: 77 BPM | OXYGEN SATURATION: 95 % | RESPIRATION RATE: 17 BRPM

## 2024-01-16 DIAGNOSIS — Z87.898 HISTORY OF IDIOPATHIC SEIZURE: ICD-10-CM

## 2024-01-16 DIAGNOSIS — G40.309 GENERALIZED EPILEPSY (HCC): ICD-10-CM

## 2024-01-16 LAB
ALBUMIN SERPL BCP-MCNC: 3.2 G/DL (ref 3.2–4.9)
ALBUMIN/GLOB SERPL: 1.1 G/DL
ALP SERPL-CCNC: 70 U/L (ref 30–99)
ALT SERPL-CCNC: 12 U/L (ref 2–50)
ANION GAP SERPL CALC-SCNC: 10 MMOL/L (ref 7–16)
APPEARANCE UR: CLEAR
AST SERPL-CCNC: 18 U/L (ref 12–45)
BACTERIA #/AREA URNS HPF: NEGATIVE /HPF
BASOPHILS # BLD AUTO: 0.4 % (ref 0–1.8)
BASOPHILS # BLD: 0.04 K/UL (ref 0–0.12)
BILIRUB SERPL-MCNC: 0.2 MG/DL (ref 0.1–1.5)
BILIRUB UR QL STRIP.AUTO: NEGATIVE
BUN SERPL-MCNC: 7 MG/DL (ref 8–22)
CALCIUM ALBUM COR SERPL-MCNC: 9.1 MG/DL (ref 8.5–10.5)
CALCIUM SERPL-MCNC: 8.5 MG/DL (ref 8.5–10.5)
CHLORIDE SERPL-SCNC: 106 MMOL/L (ref 96–112)
CO2 SERPL-SCNC: 23 MMOL/L (ref 20–33)
COLOR UR: ABNORMAL
CREAT SERPL-MCNC: 0.56 MG/DL (ref 0.5–1.4)
CREAT UR-MCNC: 51.46 MG/DL
EOSINOPHIL # BLD AUTO: 0.06 K/UL (ref 0–0.51)
EOSINOPHIL NFR BLD: 0.6 % (ref 0–6.9)
EPI CELLS #/AREA URNS HPF: ABNORMAL /HPF
ERYTHROCYTE [DISTWIDTH] IN BLOOD BY AUTOMATED COUNT: 44.5 FL (ref 35.9–50)
GFR SERPLBLD CREATININE-BSD FMLA CKD-EPI: 127 ML/MIN/1.73 M 2
GLOBULIN SER CALC-MCNC: 2.9 G/DL (ref 1.9–3.5)
GLUCOSE SERPL-MCNC: 70 MG/DL (ref 65–99)
GLUCOSE UR STRIP.AUTO-MCNC: NEGATIVE MG/DL
HCT VFR BLD AUTO: 36.6 % (ref 37–47)
HGB BLD-MCNC: 12.2 G/DL (ref 12–16)
HYALINE CASTS #/AREA URNS LPF: ABNORMAL /LPF
IMM GRANULOCYTES # BLD AUTO: 0.05 K/UL (ref 0–0.11)
IMM GRANULOCYTES NFR BLD AUTO: 0.5 % (ref 0–0.9)
KETONES UR STRIP.AUTO-MCNC: NEGATIVE MG/DL
LEUKOCYTE ESTERASE UR QL STRIP.AUTO: ABNORMAL
LYMPHOCYTES # BLD AUTO: 1.89 K/UL (ref 1–4.8)
LYMPHOCYTES NFR BLD: 17.5 % (ref 22–41)
MAGNESIUM SERPL-MCNC: 1.9 MG/DL (ref 1.5–2.5)
MCH RBC QN AUTO: 28.7 PG (ref 27–33)
MCHC RBC AUTO-ENTMCNC: 33.3 G/DL (ref 32.2–35.5)
MCV RBC AUTO: 86.1 FL (ref 81.4–97.8)
MICRO URNS: ABNORMAL
MONOCYTES # BLD AUTO: 0.65 K/UL (ref 0–0.85)
MONOCYTES NFR BLD AUTO: 6 % (ref 0–13.4)
NEUTROPHILS # BLD AUTO: 8.08 K/UL (ref 1.82–7.42)
NEUTROPHILS NFR BLD: 75 % (ref 44–72)
NITRITE UR QL STRIP.AUTO: NEGATIVE
NRBC # BLD AUTO: 0 K/UL
NRBC BLD-RTO: 0 /100 WBC (ref 0–0.2)
PH UR STRIP.AUTO: 6 [PH] (ref 5–8)
PLATELET # BLD AUTO: 196 K/UL (ref 164–446)
PMV BLD AUTO: 10.6 FL (ref 9–12.9)
POTASSIUM SERPL-SCNC: 4 MMOL/L (ref 3.6–5.5)
PROT SERPL-MCNC: 6.1 G/DL (ref 6–8.2)
PROT UR QL STRIP: 30 MG/DL
PROT UR-MCNC: 24 MG/DL (ref 0–15)
PROT/CREAT UR: 466 MG/G (ref 10–107)
RBC # BLD AUTO: 4.25 M/UL (ref 4.2–5.4)
RBC # URNS HPF: >150 /HPF
RBC UR QL AUTO: ABNORMAL
SODIUM SERPL-SCNC: 139 MMOL/L (ref 135–145)
SP GR UR STRIP.AUTO: 1.01
UROBILINOGEN UR STRIP.AUTO-MCNC: 0.2 MG/DL
WBC # BLD AUTO: 10.8 K/UL (ref 4.8–10.8)
WBC #/AREA URNS HPF: ABNORMAL /HPF

## 2024-01-16 PROCEDURE — A9270 NON-COVERED ITEM OR SERVICE: HCPCS | Performed by: OBSTETRICS & GYNECOLOGY

## 2024-01-16 PROCEDURE — 84156 ASSAY OF PROTEIN URINE: CPT

## 2024-01-16 PROCEDURE — A9579 GAD-BASE MR CONTRAST NOS,1ML: HCPCS | Performed by: OBSTETRICS & GYNECOLOGY

## 2024-01-16 PROCEDURE — 70553 MRI BRAIN STEM W/O & W/DYE: CPT

## 2024-01-16 PROCEDURE — 700102 HCHG RX REV CODE 250 W/ 637 OVERRIDE(OP): Performed by: OBSTETRICS & GYNECOLOGY

## 2024-01-16 PROCEDURE — 96374 THER/PROPH/DIAG INJ IV PUSH: CPT

## 2024-01-16 PROCEDURE — 80053 COMPREHEN METABOLIC PANEL: CPT

## 2024-01-16 PROCEDURE — G0378 HOSPITAL OBSERVATION PER HR: HCPCS

## 2024-01-16 PROCEDURE — 81001 URINALYSIS AUTO W/SCOPE: CPT

## 2024-01-16 PROCEDURE — 82570 ASSAY OF URINE CREATININE: CPT

## 2024-01-16 PROCEDURE — 36415 COLL VENOUS BLD VENIPUNCTURE: CPT

## 2024-01-16 PROCEDURE — 83735 ASSAY OF MAGNESIUM: CPT

## 2024-01-16 PROCEDURE — 302449 STATCHG TRIAGE ONLY (STATISTIC)

## 2024-01-16 PROCEDURE — 700117 HCHG RX CONTRAST REV CODE 255: Performed by: OBSTETRICS & GYNECOLOGY

## 2024-01-16 PROCEDURE — 85025 COMPLETE CBC W/AUTO DIFF WBC: CPT

## 2024-01-16 RX ORDER — ACETAMINOPHEN 325 MG/1
650 TABLET ORAL EVERY 4 HOURS PRN
Status: DISCONTINUED | OUTPATIENT
Start: 2024-01-16 | End: 2024-01-18 | Stop reason: HOSPADM

## 2024-01-16 RX ADMIN — PRENATAL WITH FERROUS FUM AND FOLIC ACID 1 TABLET: 3080; 920; 120; 400; 22; 1.84; 3; 20; 10; 1; 12; 200; 27; 25; 2 TABLET ORAL at 10:09

## 2024-01-16 RX ADMIN — ACETAMINOPHEN 1000 MG: 500 TABLET ORAL at 06:32

## 2024-01-16 RX ADMIN — GADOTERIDOL 15 ML: 279.3 INJECTION, SOLUTION INTRAVENOUS at 23:30

## 2024-01-16 ASSESSMENT — EDINBURGH POSTNATAL DEPRESSION SCALE (EPDS)
THE THOUGHT OF HARMING MYSELF HAS OCCURRED TO ME: NEVER
I HAVE FELT SAD OR MISERABLE: NO, NOT AT ALL
I HAVE LOOKED FORWARD WITH ENJOYMENT TO THINGS: AS MUCH AS I EVER DID
I HAVE BLAMED MYSELF UNNECESSARILY WHEN THINGS WENT WRONG: NOT VERY OFTEN
I HAVE BEEN ANXIOUS OR WORRIED FOR NO GOOD REASON: YES, SOMETIMES
I HAVE BEEN SO UNHAPPY THAT I HAVE HAD DIFFICULTY SLEEPING: NOT AT ALL
THINGS HAVE BEEN GETTING ON TOP OF ME: NO, MOST OF THE TIME I HAVE COPED QUITE WELL
I HAVE FELT SCARED OR PANICKY FOR NO GOOD REASON: NO, NOT MUCH
I HAVE BEEN SO UNHAPPY THAT I HAVE BEEN CRYING: NO, NEVER
I HAVE BEEN ABLE TO LAUGH AND SEE THE FUNNY SIDE OF THINGS: AS MUCH AS I ALWAYS COULD

## 2024-01-16 ASSESSMENT — FIBROSIS 4 INDEX: FIB4 SCORE: 0.74

## 2024-01-16 ASSESSMENT — PAIN DESCRIPTION - PAIN TYPE
TYPE: ACUTE PAIN
TYPE: ACUTE PAIN

## 2024-01-16 NOTE — LACTATION NOTE
This note was copied from a baby's chart.  Mom is a 27 y/o P2 who delivered baby girl weighing 7 # 4.2 oz at 40.1 wks. Mom reports that she breast fed her last baby for about 2-3 months and stopped but was breastfeeding well until then. Mom has history of Bell's Palsy in November  that went away within a week on its own.Mom states this baby is nursing fine and declines any assist at this time but is aware that she can call for assistance.   LC provided information on the  [employer] hand expression video and briefly discussed cluster feeding tonight. LC reviewed demand feeds of 8 or more times in 24 hours.  Mom has a pump at home for personal use.

## 2024-01-16 NOTE — DISCHARGE SUMMARY
Discharge Summary:      Vika Alvarez    Admit Date:   2024  Discharge Date:  2024     Admitting diagnosis:  Indication for care in labor or delivery [O75.9]  Discharge Diagnosis: Status post vaginal, spontaneous.  1.  Intrauterine pregnancy at 40+1 weeks' gestation.  2.  GBS negative.  3.  Rh negative.  4.  Favorable cervix.  5.  Maternal discomfort with contractions.  6.  Induction of labor.     History:  History reviewed. No pertinent past medical history.  OB History    Para Term  AB Living   2 2 2     2   SAB IAB Ectopic Molar Multiple Live Births           0 2      # Outcome Date GA Lbr Leon/2nd Weight Sex Delivery Anes PTL Lv   2 Term 24 40w1d / 00:09 3.295 kg (7 lb 4.2 oz) M Vag-Spont EPI N REGLA   1 Term                 Patient has no known allergies.  Patient Active Problem List    Diagnosis Date Noted    Indication for care in labor or delivery 2024        Hospital Course:   28 y.o. , now para 2, was admitted with the above mentioned diagnosis, underwent Induction of Labor. Patient progressed to complete and had a vaginal, spontaneous delivery. Patient postpartum course was unremarkable, with progressive advancement in diet , ambulation and toleration of oral analgesia. Patient without complaints today and desires discharge.      Vitals:    24 2330 01/15/24 0006 01/15/24 0556 01/15/24 1854   BP: 116/56 138/85 116/65 125/86   Pulse: 72 75 72 80   Resp:  18 18 17   Temp:  37.3 °C (99.1 °F) 36.7 °C (98.1 °F) 36.4 °C (97.5 °F)   TempSrc:  Temporal Temporal Temporal   SpO2:  100% 98% 96%   Weight:       Height:           Current Facility-Administered Medications   Medication Dose    lactated ringers infusion      docusate sodium (Colace) capsule 100 mg  100 mg    ibuprofen (Motrin) tablet 800 mg  800 mg    acetaminophen (Tylenol) tablet 1,000 mg  1,000 mg    tetanus-dipth-acell pertussis (Tdap) inj 0.5 mL  0.5 mL    measles, mumps and rubella vaccine (Mmr)  injection 0.5 mL  0.5 mL    methylergonovine (Methergine) injection 0.2 mg  0.2 mg    PRN oxytocin (PITOCIN) (20 Units/1000 mL) PRN for excessive uterine bleeding - See Admin Instr  125-999 mL/hr    oxyCODONE immediate-release (Roxicodone) tablet 5 mg  5 mg    prenatal plus vitamin (Stuartnatal 1+1) 27-1 MG tablet 1 Tablet  1 Tablet    simethicone (Mylicon) chewable tablet 125 mg  125 mg    LR infusion      oxytocin (Pitocin) infusion (for post delivery)  125 mL/hr    oxytocin (Pitocin) infusion (for induction)  0.5-20 moncho-units/min    ropivacaine 0.2 % (Naropin) injection         Exam:  Gen: NAD  Breast Exam: negative  Abdomen: Abdomen soft, non-tender. BS normal. No masses,  No organomegaly  Fundus Non Tender: no  Extremity: extremities, peripheral pulses and reflexes normal     Labs:  Recent Labs     01/14/24  1308 01/15/24  1121   WBC 8.5 12.0*   RBC 4.13* 4.06*   HEMOGLOBIN 11.5* 11.6*   HEMATOCRIT 36.0* 35.4*   MCV 87.2 87.2   MCH 27.8 28.6   MCHC 31.9* 32.8   RDW 44.2 44.9   PLATELETCT 187 153*   MPV 12.0 11.6        Activity:   Discharge to home  Pelvic Rest x 6 weeks    Assessment:  normal postpartum course  Discharge Assessment: No heavy bleeding or foul vaginal discharge      Follow up: [facility] in 5 weeks for vaginal ; 1 week for incision check.      Discharge Meds:   No current outpatient medications on file.   OTC ibuprofen/tylenol    Margi Modi M.D.           numerical 0-10

## 2024-01-16 NOTE — CARE PLAN
Problem: Pain - Standard  Goal: Alleviation of pain or a reduction in pain to the patient’s comfort goal  Outcome: Progressing     Problem: Psychosocial - Postpartum  Goal: Patient will verbalize and demonstrate effective bonding and parenting behavior  Outcome: Progressing     Problem: Altered Physiologic Condition  Goal: Patient physiologically stable as evidenced by normal lochia, palpable uterine involution and vitals within normal limits  Outcome: Progressing     Problem: Infection - Postpartum  Goal: Postpartum patient will be free of signs and symptoms of infection  Outcome: Progressing   The patient is Stable - Low risk of patient condition declining or worsening    Shift Goals  Clinical Goals: vitals stable, fundus firm, ambulating and voiding without difficulty  Patient Goals: breast feed, pain control  Family Goals: support, bonding    Progress made toward(s) clinical / shift goals:  Vika is post partum with her second baby, she was receiving antibiotics for suspected infection, but has completed the ordered doses, her vitals are stable, her fundus is firm, she is ambulating and voiding without difficulty, she is breast feeding her infant well and both her and her SO are bonding well with baby    Patient is not progressing towards the following goals:

## 2024-01-16 NOTE — PROGRESS NOTES
Shift Goals  Clinical Goals: vitals stable, fundus firm, ambulating and voiding without difficulty  Patient Goals: breast feed, pain control  Family Goals: support, bonding    Progress made toward(s) clinical / shift goals:  Vika is post partum with her second baby, she was receiving antibiotics for suspected infection, but has completed the ordered doses, her vitals are stable, her fundus is firm, she is ambulating and voiding without difficulty, she is breast feeding her infant well and both her and her SO are bonding well with baby

## 2024-01-16 NOTE — PROGRESS NOTES
Discharged home with baby. Ambulated to car without difficulty. Instructions given on infant care and safety self care , and reasons to dcalll the dr Martinez

## 2024-01-16 NOTE — CARE PLAN
The patient is Stable - Low risk of patient condition declining or worsening    Shift Goals  Clinical Goals: vss lochia light  Patient Goals: breast feed, pain control  Family Goals: support, bonding    Progress made toward(s) clinical / shift goals:  vss ambulating without difficulty. Lochia light    Patient is not progressing towards the following goals:

## 2024-01-16 NOTE — LACTATION NOTE
This note was copied from a baby's chart.  Follow up lactation support:  Mom reports breast feeding is going  well and mother has no concerns. She states baby was cluster feeding last night. LC reviewed demand feeds, continuing STS and observing for changes in stool over next several days and increase in wet diaper. Mom will follow up before the weekend with pediatrics. LC recommends that mother call peds earlier for any concerns.  LC encouraged mo to ask for assist before she leaves and mother declines needing any help.

## 2024-01-16 NOTE — DISCHARGE INSTRUCTIONS
PATIENT DISCHARGE EDUCATION INSTRUCTION SHEET  REASONS TO CALL YOUR PEDIATRICIAN  Projectile or forceful vomiting for more than one feeding  Unusual rash lasting more than 24 hours  Very sleepy, difficult to wake up  Bright yellow or pumpkin colored skin with extreme sleepiness  Temperature below 97.6 or above 100.4 F rectally  Feeding problems  Breathing problems  Excessive crying with no known cause  If cord starts to become red, swollen, develops a smell or discharge  No wet diaper or stool in a 24 hour time period     REASONS TO CALL YOUR OBSTETRICIAN  Persistent fever, shaking, chills (Temperature higher than 100.4) may indicate you have an infection  Heavy bleeding: soaking more than 1 pad per hour; Passing clots an egg-sized clot or bigger may mean you have an postpartum hemorrhage  Foul odor from vagina or bad smelling or discolored discharge or blood  Breast infection (Mastitis symptoms); breast pain, chills, fever, redness or red streaks, may feel flu like symptoms  Urinary pain, burning or frequency  Incision that is not healing, increased redness, swelling, tenderness or pain, or any pus from episiotomy or  site may mean you have an infection  Redness, swelling, warmth, or painful to touch in the calf area of your leg may mean you have a blood clot  Severe or intensified depression, thoughts or feelings of wanting to hurt yourself or someone else   Pain in chest, obstructed breathing or shortness of breath (trouble catching your breath) may mean you are having a postpartum complication. Call your provider immediately   Headache that does not get better, even after taking medicine, a bad headache with vision changes or pain in the upper right area of your belly may mean you have high blood pressure or post birth preeclampsia. Call your provider immediately    SAFE SLEEP POSITIONING FOR YOUR BABY  The [employer] advises your baby should be placed on his/her back for Sleeping  to reduce the risk of Sudden Infant Death Syndrome (SIDS)  Baby should sleep by themselves in a crib, portable crib or bassinet  Baby should not share a bed with his/her parents  Baby should be placed on his or her back to sleep, night time and at naps  Baby should sleep on firm mattress with a tightly fitted sheet  NO couches, waterbeds or anything soft  Baby's sleep area should not contain any loose blankets, comforters, stuffed animals or any other soft items, (pillows, bumper pads, etc. ...)  Baby's face should be kept uncovered at all times  Baby should sleep in a smoke-free environment  Do not dress baby too warmly to prevent overheating    HAND WASHING  All family and friends should wash their hands:  Before and after holding the baby  Before feeding the baby  After using the restroom or changing the baby's diaper     CARE    TAKING BABY'S TEMPERATURE  If you feel your baby may have a fever take your baby's temperature per thermometer instructions  If taking axillary temperature place thermometer under baby's armpit and hold arm close to body  The most precise and accurate way to take a temperature is rectally  Turn on the digital thermometer and lubricate the tip of the thermometer with petroleum jelly.  Lay your baby or child on his or her back, lift his or her thighs, and insert the lubricated thermometer 1/2 to 1 inch (1.3 to 2.5 centimeters) into the rectum  Call your Pediatrician for temperature lower than 97.6 or greater than 100.4 F rectally    BATHE AND SHAMPOO BABY  Gently wash baby with a soft cloth using warm water and mild soap - rinse well  Do not put baby in tub bath until umbilical cord falls off and appears well-healed  Bathing baby 2-3 times a week might be enough until your baby becomes more mobile. Bathing your baby too much can dry out his or her skin     NAIL CARE  First recommendation is to keep them covered to prevent facial scratching  During the first few weeks,  nails  are very soft. Doctors recommend using only a fine emery board. Don't bite or tear your baby's nails. When your baby's nails are stronger, after a few weeks, you can switch to clippers or scissors making sure not to cut too short and nip the quick   A good time for nail care is while your baby is sleeping and moving less    CORD CARE  Fold diaper below umbilical cord until cord falls off  Keep umbilical cord clean and dry  May see a small amount of crust around the base of the cord. Clean off with mild soap and water and dry             DIAPER AND DRESS BABY  For baby girls: gently wipe from front to back. Mucous or pink tinged drainage is normal  For uncircumcised baby boys: do NOT pull back the foreskin to clean the penis. Gently clean with wipes or warm, soapy water  Dress baby in one more layer of clothing than you are wearing  Use a hat to protect from sun or cold. NO ties or drawstrings    URINATION AND BOWEL MOVEMENTS  If formula feeding or when breast milk feeding is established, your baby should wet 6-8 diapers a day and have at least 2 bowel movements a day during the first month  Bowel movements color and type can vary from day to day    CIRCUMCISION  What to watch out for:  Foul smelling discharge  Fever  Swelling   Crusty, fluid filled sores  Trouble urinating   Persistent bleeding or more than a quarter size spot of blood on his diaper  Yellow discharge lasting more than a week  Continue with care procedures until healed or have a visit with your Pediatrician     INFANT FEEDING  Most newborns feed 8-12 times, every 24 hours. YOU MAY NEED TO WAKE YOUR BABY UP TO FEED  If breastfeeding, offer both breasts when your baby is showing feeding cues, such as rooting or bringing hand to mouth and sucking  Common for  babies to feed every 1-3 hours   Only allow baby to sleep up to 4 hours in between feeds if baby is feeding well at each feed. Offer breast anytime baby is showing feeding cues and at  least every 3 hours  Follow up with outpatient [facility] for continued breast feeding support    FORMULA FEEDING  Feed baby formula every 2-3 hours when your baby is showing feeding cues  Paced bottle feeding will help baby not over eat at each feed     BOTTLE FEEDING   Paced Bottle Feeding is a method of bottle feeding that allows the infant to be more in control of the feeding pace. This feeding method slows down the flow of milk into the nipple and the mouth, allowing the baby to eat more slowly, and take breaks. Paced feeding reduces the risk of overfeeding that may result in discomfort for the baby   Hold baby almost upright or slightly reclined position supporting the head and neck  Use a small nipple for slow-flowing. Slow flow nipple holes help in controlling flow   Don't force the bottle's nipple into your baby's mouth. Tickle babies lip so baby opens their mouth  Insert nipple and hold the bottle flat  Let the baby suck three to four times without milk then tip the bottle just enough to fill the nipple about snf with milk  Let baby suck 3-5 continuous swallows, about 20-30 seconds tip the bottle down to give the baby a break  After a few seconds, when the baby begins to suck again, tip bottle up to allow milk to flow into the nipple  Continue to Pace feed until baby shows signs of fullness; no longer sucking after a break, turning away or pushing away the nipple   Bottle propping is not a recommended practice for feeding  Bottle propping is when you give a baby a bottle by leaning the bottle against a pillow, or other support, rather than holding the baby and the bottle.  Forces your baby to keep up with the flow, even if the baby is full   This can increase your baby's risk of choking, ear infections, and tooth decay    BOTTLE PREPARATION   Never feed  formula to your baby, or use formula if the container is dented  When using ready-to-feed, shake formula containers before  "opening  If formula is in a can, clean the lid of any dust, and be sure the can opener is clean  Formula does not need to be warmed. If you choose to feed warmed formula, do not microwave it. This can cause \"hot spots\" that could burn your baby. Instead, set the filled bottle in a bowl of warm (not boiling) water or hold the bottle under warm tap water. Sprinkle a few drops of formula on the inside of your wrist to make sure it's not too hot  Measure and pour desired amount of water into baby bottle  Add unpacked, level scoop(s) of powder to the bottle as directed on formula container. Return dry scoop to can  Put the cap on the bottle and shake. Move your wrist in a twisting motion helps powder formula mix more quickly and more thoroughly  Feed or store immediately in refrigerator  You need to sterilize bottles, nipples, rings, etc., only before the first use    CLEANING BOTTLE  Use hot, soapy water  Rinse the bottles and attachments separately and clean with a bottle brush  If your bottles are labelled  safe, you can alternatively go ahead and wash them in the    After washing, rinse the bottle parts thoroughly in hot running water to remove any bubbles or soap residue   Place the parts on a bottle drying rack   Make sure the bottles are left to drain in a well-ventilated location to ensure that they dry thoroughly  CAR SEAT  For your baby's safety and to comply with [facility] Law you will need to bring a car seat to the hospital before taking your baby home. Please read your car seat instructions before your baby's discharge from the hospital.  Make sure you place an emergency contact sticker on your baby's car seat with your baby's identifying information  Car seat should not be placed in the front seat of a vehicle. The car seat should be placed in the back seat in the rear-facing position.  Car seat information is available through [employer] at [phone] and also at " [email or web address]    MATERNAL CARE     WOUND CARE  Ask your physician for additional care instructions. In general:   Incision:  May shower and pat incision dry   Keep the incision clean and dry  There should not be any opening or pus from the incision  Continue to walk at home 3 times a day   Do NOT lift anything heavier than your baby (over 10 pounds)  Encourage family to help participate in care of the  to allow rest and mom time to heal    Episiotomy/Laceration  May use rosaline-spray bottle, witch hazel pads and dermaplast spray for comfort  Use rosaline-spray bottle after urinating to cleanse perineal area  To prevent burning during urination spray rosaline-water bottle on labial area   Pat perineal area dry until episiotomy/laceration is healed  Continue to use rosaline-bottle until bleeding stops as needed  If have a 2nd degree laceration or greater, a Sitz bath can offer relief from soreness, burning, and inflammation   Sitz Bath   Sit in 6 inches of warm water and soak laceration as needed until the laceration heals    VAGINAL CARE AND BLEEDING  Nothing inside vagina for 6 weeks:   No sexual intercourse, tampons or douching  Bleeding may continue for 2-4 weeks. Amount and color may vary  Soaking 1 pad or more in an hour for several hours is considered heavy bleeding  Passing large egg sized blood clots can be concerning  If you feel like you have heavy bleeding or are having increasing amount of blood clots call your Obstetrician immediately  If you begin feeling faint upon standing, feeling sick to your stomach, have clammy skin, a really fast heartbeat, have chills, start feeling confused, dizzy, sleepy or weak, or feeling like you're going to faint call your Obstetrician immediately    HYPERTENSION   Preeclampsia or gestational hypertension are types of high blood pressure that only pregnant women can get. It is important for you to be aware of symptoms to seek early intervention and treatment. If you  "have any of these symptoms immediately call your Obstetrician    Vision changes or blurred vision   Severe headache or pain that is unrelieved with medication and will not go away  Persistent pain in upper abdomen or shoulder   Increased swelling of face, feet, or hands  Difficulty breathing or shortness of breath at rest  Urinating less than usual    URINATION AND BOWEL MOVEMENTS  Eating more fiber (bran cereal, fruits, and vegetables) and drinking plenty of fluids will help to avoid constipation  Urinary frequency and urgency after childbirth is normal  If you experience any urinary pain, burning or frequency call your provider    BIRTH CONTROL  It is possible to become pregnant at any time after delivery and while breastfeeding  Plan to discuss a method of birth control with your physician at your post-delivery follow up visit    POSTPARTUM BLUES  During the first few days after birth, you may experience a sense of the \"blues\" which may include impatience, irritability or even crying. These feelings come and go quickly. However, as many as 1 in 10 women experience emotional symptoms known as postpartum depression.     POSTPARTUM DEPRESSION    May start as early as the second or third day after delivery or take several weeks or months to develop. Symptoms of \"blues\" are present, but are more intense: Crying spells; loss of appetite; feelings of hopelessness or loss of control; fear of touching the baby; over concern or no concern at all about the baby; little or no concern about your own appearance/caring for yourself; and/or inability to sleep or excessive sleeping. Contact your Obstetrician if you are experiencing any of these symptoms     PREVENTING SHAKEN BABY  If you are angry or stressed, PUT THE BABY IN THE CRIB, step away, take some deep breaths, and wait until you are calm to care for the baby. DO NOT SHAKE THE BABY. You are not alone, call a supporter for help.  [facility] 24/7 crisis call line " "([phone]) or (1-638.105.4482)  You can also text them, text \"ANSWER\" (450974)      "

## 2024-01-17 PROCEDURE — 99417 PROLNG OP E/M EACH 15 MIN: CPT | Performed by: PSYCHIATRY & NEUROLOGY

## 2024-01-17 PROCEDURE — 700111 HCHG RX REV CODE 636 W/ 250 OVERRIDE (IP): Mod: JZ | Performed by: PSYCHIATRY & NEUROLOGY

## 2024-01-17 PROCEDURE — 95816 EEG AWAKE AND DROWSY: CPT | Performed by: STUDENT IN AN ORGANIZED HEALTH CARE EDUCATION/TRAINING PROGRAM

## 2024-01-17 PROCEDURE — 96375 TX/PRO/DX INJ NEW DRUG ADDON: CPT

## 2024-01-17 PROCEDURE — G0378 HOSPITAL OBSERVATION PER HR: HCPCS

## 2024-01-17 PROCEDURE — 99205 OFFICE O/P NEW HI 60 MIN: CPT | Mod: 25,GC | Performed by: PSYCHIATRY & NEUROLOGY

## 2024-01-17 RX ORDER — LEVETIRACETAM 500 MG/1
500 TABLET ORAL 2 TIMES DAILY
Qty: 180 TABLET | Refills: 0 | Status: SHIPPED | OUTPATIENT
Start: 2024-01-17 | End: 2024-02-20

## 2024-01-17 RX ORDER — LEVETIRACETAM 500 MG/5ML
1500 INJECTION, SOLUTION, CONCENTRATE INTRAVENOUS ONCE
Status: COMPLETED | OUTPATIENT
Start: 2024-01-17 | End: 2024-01-17

## 2024-01-17 RX ORDER — LEVETIRACETAM 500 MG/1
750 TABLET ORAL 2 TIMES DAILY
Status: CANCELLED | OUTPATIENT
Start: 2024-01-18

## 2024-01-17 RX ADMIN — LEVETIRACETAM 1500 MG: 100 INJECTION, SOLUTION, CONCENTRATE INTRAVENOUS at 19:19

## 2024-01-17 NOTE — CONSULTS
Neurology Initial Consult H&P  Neurohospitalist Service, [facility] Neurosciences    Referring Physician: Jovita Fong, *    No chief complaint on file.      HPI: Vika Alvarez is a 28 y.o.  2, para 2 presenting for whom neurology has been consulted for like activity.  Patient recently underwent induction of labor on 2024, was discharged on 2024.  Patient was interviewed with her , who both report that she went home later on on the  and then subsequently had motions concerning for seizure-like activities.  Patient's  reports that she turned her head to the right contracted her neck and her back and was in a tonic like post for 3 to 5 minutes.  Patient reports that during this event her eyes were open but she was nonresponsive to verbal stimuli or response.  Patient has been then reports a second event of similar activity that also involved slurred words, tongue biting, and drooling that lasted about 30 seconds to 1 minutes.  Patient's  reports that after the second event she was not able to recognize him and was not at her baseline mentation; a third event occurred in which the patient was in a rigid tonic pose with her eyes open but not responsive to verbal stimuli.  Patient's  reports that he called EMS and when [facility]ell arrived she was unaware what had previously taken place but was able to recognize him.   denies any incontinence, or observed trauma; patient's  reports that these events occurred while the patient was in the chair breast-feeding.  Prior to onset of these events patient and  denies any fever, chills, nausea, vomiting, and headaches.  Patient reports she had a headache when she arrived to the hospital but had no significant headache before onset of symptoms.  Patient denies a history of hypertension, ever being on hypertension medications, history of neurologic conditions, and preeclampsia.    On  "presentation vital signs are within normal limits, CMP and CBC were primarily within normal limits; urinalysis revealed 30 protein, large amount of occult blood; urine total protein was elevated at 24.  MRI brain with and without contrast was ordered to be \"within normal limits\".    Review of systems: In addition to what is detailed in the HPI above, all other systems reviewed and are negative.    Past Medical History:    has no past medical history on file.    FHx:  family history is not on file.    SHx:   reports that she has been smoking cigarettes. She has never used smokeless tobacco. She reports that she does not currently use alcohol. She reports that she does not currently use drugs.    Allergies:  No Known Allergies    Medications:    Current Facility-Administered Medications:     acetaminophen (Tylenol) tablet 650 mg, 650 mg, Oral, Q4HRS PRN, Ruddy Mcneil M.D.    Physical Examination:     General: Patient is awake and in no acute distress  Eye: Examination of optic disks not indicated at this time given acuity of consult  Mouth: Erythema and cut in right lateral tongue.  Neck: There is normal range of motion  CV: regular rate   Extremities:  clear, dry, intact, without peripheral edema    NEUROLOGICAL EXAM:     /79   Pulse 86   Temp 36.4 °C (97.6 °F) (Temporal)   Resp 15   Wt 78.9 kg (173 lb 15.1 oz)   LMP 03/29/2023   SpO2 94%   Breastfeeding Yes Comment: pumping  BMI 29.86 kg/m²       Mental status: Awake, alert and fully oriented  Speech and language: Speech is clear and fluent. The patient is able to name and repeat, and follow commands  Cranial nerve exam: Pupils are equal, round and reactive to light bilaterally. Visual fields are full. There is no nystagmus. Extraocular muscles are intact. Face is symmetric. Sensation in the face is intact to light touch. Palate elevates symmetrically. Tongue is midline.  Motor exam: There is sustained antigravity with no downward drift in " "bilateral arms and legs.  There is no pronator drift. Tone is normal. No abnormal movements were seen on exam.  Sensory exam: Reacts to tactile in all 4 extremities, no neglect to double stim   Deep tendon reflexes:  2+ throughout. Toes down-going bilaterally.  Coordination: No dysmetria on testing, finger-to-nose test deferred as patient was breast-feeding bilaterally.  Gait: Deferred due to patient preference        Objective Data:    Labs:  No results found for: \"PROTHROMBTM\", \"INR\"   Lab Results   Component Value Date/Time    WBC 10.8 01/16/2024 08:37 PM    RBC 4.25 01/16/2024 08:37 PM    HEMOGLOBIN 12.2 01/16/2024 08:37 PM    HEMATOCRIT 36.6 (L) 01/16/2024 08:37 PM    MCV 86.1 01/16/2024 08:37 PM    MCH 28.7 01/16/2024 08:37 PM    MCHC 33.3 01/16/2024 08:37 PM    MPV 10.6 01/16/2024 08:37 PM    NEUTSPOLYS 75.00 (H) 01/16/2024 08:37 PM    LYMPHOCYTES 17.50 (L) 01/16/2024 08:37 PM    MONOCYTES 6.00 01/16/2024 08:37 PM    EOSINOPHILS 0.60 01/16/2024 08:37 PM    BASOPHILS 0.40 01/16/2024 08:37 PM      Lab Results   Component Value Date/Time    SODIUM 139 01/16/2024 08:37 PM    POTASSIUM 4.0 01/16/2024 08:37 PM    CHLORIDE 106 01/16/2024 08:37 PM    CO2 23 01/16/2024 08:37 PM    GLUCOSE 70 01/16/2024 08:37 PM    BUN 7 (L) 01/16/2024 08:37 PM    CREATININE 0.56 01/16/2024 08:37 PM      No results found for: \"CHOLSTRLTOT\", \"LDL\", \"HDL\", \"TRIGLYCERIDE\"    Lab Results   Component Value Date/Time    ALKPHOSPHAT 70 01/16/2024 08:37 PM    ASTSGOT 18 01/16/2024 08:37 PM    ALTSGPT 12 01/16/2024 08:37 PM    TBILIRUBIN 0.2 01/16/2024 08:37 PM        Imaging/Testing:    I interpreted and/or reviewed the patient's neuroimaging    MR-BRAIN-WITH & W/O   Final Result         Contrast enhanced brain  MRI within normal limits.          Impression and Recommendations: Vika Alvarez is a 28 y.o. presenting for whom neurology has been consulted for seizure-like activity.  History concerning for recurrent postpartum tonic like " "seizure.  No indication for eclampsia or preeclampsia.  MRI nonsuggestive of lesions or reversible causes or triggers.  EEG reports \"Frequent 2-3 second bursts of approximately 3 Hz high amplitude spike and polyspike wave discharges \" and is suggestive of generalized epilepsy.  Urinalysis did reveal elevated urine protein but also contained blood which is concerning for possible lochia. 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  -Oral/tongue care to prevent infection  - Loaded with Keppra 1500 mg once  - After Keppra load, then start Keppra 750 mg twice daily  -Outpatient neurology follow-up     Patient educated and advised of the following:  It will be important to start Keppra at this time and continue with antiseizure medication.  Keppra is transmitted through the breastmilk but at a low rate. The relative infant dose (RID) of levetiracetam is 7.9% when calculated using data derived from the highest breast milk concentration located and compared to a weight-adjusted maternal dose of 1 to 3 g/day.    In general, breastfeeding is considered acceptable when the RID is <10% (Lucian 2016; Jaison 2000).    The benefits of starting Keppra outweigh the risk of potential side effects from the Keppra being passed through breast milk because should patient have another seizure could result in death of her her infant and or significant serious injury.  The patient has a , and is at high risk for additional seizures considering the sleep deprivation, stress, and other factors associated with having a .  The patient is also advised that it is possible that she might have another breakthrough seizure while on the Keppra medication.  Patient is advised of the following:  - Avoid bathing alone, bathing the baby when another adult is not present, no driving, no heavy machinery, avoid heights, no sleeping with baby, and avoid " flashing lights until evaluated by neurology outpatient.  - Recommend help at night  - Recommend minimal disruptions  - Patient should not be left alone with the baby  -Can be discharged after starting Keppra and upon clearance by Obstetrics and gynecology    Abdulaziz Youssef MD  Neurohospitalist, Acute Care Services      The evaluation of the patient, and recommended management, was discussed with  **     Upon my evaluation, this patient had a high probability of imminent or life-threatening deterioration due to having several repeated seizures which required my direct attention, intervention, and personal management.  I personally provided 55 minutes of total acute neurologic care time outside of time spent on separately billable/documented procedures. Time includes: review of laboratory data, review of radiology studies, discussion with consultants, discussion with family/patient, monitoring for potential decompensation.  Interventions were performed as documented in the chart.        Please note that this dictation was created using voice recognition software.  I have made every reasonable attempt to correct obvious errors, but I expect that there are errors of grammar and possibly content that I did not discover before finalizing the note.

## 2024-01-17 NOTE — PROGRESS NOTES
193: OT arrived to unit via [facility]. Report from EMS personnel at bedside.   Pt delivered on 24 . Discharged today 2024.  Pt states she is not in any pain at this time, no acute swelling, no vision changes.     Approx : Dr. Mcneil at bedside to assess patient.   MD garcia with pt having bathroom privileges.     : Per pt request, this RN brought breast pump and supplies to bedside.    2305: transport to MRI via wheelchair ( transport Janie)      2342 :Pt returned from MRI via transport, Dr. Mcneil notified     0030: Report to Karyna LIZAMA RN

## 2024-01-17 NOTE — H&P
DATE OF ADMISSION:  2024     CHIEF COMPLAINT:  Suspected seizure.     HISTORY OF PRESENT ILLNESS:  The patient is a 28-year-old lady,  2,   para 2.  She had uncomplicated term vaginal birth of a healthy infant 2 days   ago.  She was normotensive throughout her pregnancy, labor and delivery and   postpartum.  She has never been treated for hypertension or preeclampsia.  She   has no previous history of any neurologic conditions.  She has never been   suspected of having a seizure disorder.    I got history first from the patient, then from her  after he arrived.     The patient was brought here by ambulance tonight.  She recalls feeling   completely exhausted all day today , ever since her discharge from the hospital   this morning.  She recalls breastfeeding her infant in a chair, and then arguing   with her  about the need to come to the hospital by ambulance.  Her    recalls that she was breastfeeding her infant in a chair and suddenly   turned her head to one side , had a succession of unusual movements and posturing, including   extension and flexion of her spine, clinching of her fists, fine tremor of   both hands, labored, noisy breathing, and drooling.  He feels that she bit her   tongue.  He says that she lost consciousness, and regained consciousness   several times over the ensuing 9 minutes.  He states that the first couple of   times she regained consciousness that she did not seem to recognize him.  At   the end of this 9 minute episode, when the ambulance personnel had arrived, he   says that she did seem to recognize him when she regained consciousness.      She has no memory of this entire 9 minute episode.  She recalls nothing other   than breastfeeding her infant and then arguing with her  about the need   to come to the hospital. She reports a mild intermittent headache all day, but she never had a severe or thunderclap headache.      The ambulance  personnel did not note any obvious seizure activity on the way   here.     Presently, the patient is alert, pleasant, oriented in all regards.  Her   neurologic exam is completely unrevealing.  Her blood pressure is normal.  She   has no edema and no laboratory evidence of severe preeclampsia.     I spoke with the neurologist on-call, Dr. Villareal.  He advised that I order a   stat brain MRI with and without contrast, and EEG. We both agree with not   starting her on any antiepileptic medications at this time.  Specifically, I   am not ordering intravenous magnesium sulfate as my index of suspicion for   eclamptic seizure is very small.  Dr. Villareal mentioned that this could   possibly be a case of RCVS, Reverse Cerebral Vasoconstriction Syndrome, which   can occur postpartum.     PAST MEDICAL HISTORY:  Unremarkable.     OBSTETRIC HISTORY:  She has had two spontaneous vaginal births, most recently 2 days ago.  She has never had any symptoms of preeclampsia.     PAST SURGICAL HISTORY:  None.     ALLERGIES:  No known drug allergies.     MEDICATIONS:  Prenatal vitamin daily, ibuprofen, Tylenol as needed for pain.     SOCIAL HISTORY:  She is .  She is employed as a Medical Assistant for   [employer].  She denies alcohol, tobacco or drug use.     PHYSICAL EXAMINATION:  VITAL SIGNS:  Afebrile, blood pressure 129/83, pulse 97, O2 sat on room air is   89%.  Weight 78.9 kilograms.  HEENT:  Normal.  LUNGS:  Clear to auscultation.  HEART:  Sounds normal.  ABDOMEN:  Nontender.  No hepatosplenomegaly.  Uterus is involuting normally.    Uterus is nontender.  EXTREMITIES:  No edema.  Homans' negative.  DTRs normal.  There is no clonus.  NEUROLOGIC:  Entirely within normal limits.  Optic disks appear normal.     LABORATORY DATA:  Hemoglobin 12.2, hematocrit 36.6, white blood count 10,800,   platelets 196,000.  Electrolytes are within normal limits.  Glucose 70, BUN 7,   creatinine 0.56, calcium 8.5, SGOT 18, SGPT  12, bilirubin 0.2, magnesium 1.9.    Urine protein to creatinine ratio 466 (voided specimen, which appears to be   contaminated lochia based on the RBCs in the urinalysis).  Urinalysis negative for glucose,   negative for ketones, protein 30 mg/dL, small leukocyte esterase, nitrite   negative, 10-20 white blood cells, more than 150 red blood cells, negative for   bacteria.     DIAGNOSES:  1.  History suspicious for idiopathic seizure, no prior seizure disorder.  2.  Two days' postpartum, no convincing evidence for preeclampsia.     PLAN:      Observe for recurrent seizurelike activity.  Stat head MRI is in progress.    I have ordered an EEG.    Requested neurology consultation.  If there are any worrisome findings on the MRI or any recurrent seizure-like episodes, Dr. Villareal will see her tonight.  Otherwise, his colleague, Dr. Youssef will see her tomorrow.    For the time being, we will not start any antiepileptic medications or magnesium sulfate.        ______________________________  MD YAAKOV Hernandez/ROSALBA    DD:  01/16/2024 23:11  DT:  01/17/2024 00:42    Job#:  [id number]

## 2024-01-17 NOTE — PROCEDURES
INPATIENT ROUTINE VIDEO ELECTROENCEPHALOGRAM REPORT    REFERRING PROVIDER: Jovita Fong M.d.  DOS: 1/17/2024  STUDY DURATION: 0 hours and 25 minutes of total recording time.     INDICATION:  Vika Alvarez 28 y.o. female presenting with seizure(s)    RELEVANT MEDICATIONS/TREATMENTS:   Scheduled Medications   Medication Dose Frequency       TECHNIQUE:   Routine VEEG was set up by a Neurodiagnostic technologist who performed education to the patient and staff. A minimum of 23 electrodes and 23 channel recording was setup and performed by Neurodiagnostic technologist, in accordance with the international 10-20 system. The study was reviewed in bipolar and referential montages. The recording examined the patient in the  awake state(s).     DESCRIPTION OF THE RECORD:  During wakefulness, the background was continuous and showed a 9-10 Hz posterior dominant rhythm.  There was reactivity to eye closure/opening.  An anterior-posterior gradient was noted with faster beta frequencies seen anteriorly.  During drowsiness, theta/delta frequencies were seen.    Stage II sleep was not captured on presents tudy.     ICTAL AND INTERICTAL FINDINGS:   1) Frequent 2-3 second bursts of approximately 3 Hz high amplitude (216.8 uV) spike and polyspike wave discharges  2) Occasional generalized bifrontally predominant sharp waves    Example of #1 at a sensitivity of 20 uV per minute to illustrate waveform morphology      ACTIVATION PROCEDURES:   Intermittent Photic stimulation was performed in a stepwise fashion from 1 to 30 Hz. EEG abnormalities were noted due to the presence of photoparoxysmal responses at 6, 9,11, 14, 17, 20, 25, and 30 Hz stimulation. These are characterized by generalized polyspike wave discharges of approximately 3 Hz.     Hyperventilation not performed.       EKG: Single lead EKG regular.     EVENTS:  None    INTERPRETATION:   Abnormal video EEG recording in the awake state(s):  1) Frequent 2-3 second  bursts of approximately 3 Hz high amplitude spike and polyspike wave discharges  2) Occasional generalized bifrontally predominant sharp waves, a finding likely representing generalized spike wave fragments.   3) A photoparoxysmal response was captured at multiple frequencies, of similar morphology to the generalized spike and polyspike waves seen outside of photic stimulation.    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    Katty Au MD  Department of Neurology at [facility]  General Neurologist and Epileptologist   of Clinical Neurology, [facility].

## 2024-01-17 NOTE — PROGRESS NOTES
3 days postpartum    S: Pt and  deny any further spells, she slept comfortably thru the night.  Denies: headache, visual symptoms.    MRI was unrevealing according to the night Radiologist Dr. James, a NeuroRadiologist will read the study today.    EEG ordered.    PE:    Afebrile /62  No edema  DTR 1+, no clonus      IMP:    3 days postpartum  2.   S/p 9-minute spell 12 hrs ago suspicious for seizure, no recurrence, possible R.C.V.S.  3.   Doubt eclampsia (BP normal, no edema, DTR normal, no lab derangements).  I suspect the proteinuria noted on her voided specimen is insignificant as it was contaminated with lochia (>150 RBCs).     PLAN:    Observe.  EEG.  Neurology consult this AM, Dr. Abdulaziz Youssef, NeuroHospitalist.  No antiepileptic meds have been given.
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
## 2024-01-17 NOTE — PROGRESS NOTES
0030: Report from Renita YOUNGBLOOD RN. POC discussed, assumed care of pt. RN ta bedside, pt currently sleeping. VSS. No complaints at this time.  0230: RN at bedside. Pt and FOB sleeping comfortably. No needs at this time.  0403: RN at bedside. VSS. Pt awake and doing well. No needs at this time.  0700: Report to Tata SAGE. POC discussed.

## 2024-01-17 NOTE — CARE PLAN
The patient is Watcher - Medium risk of patient condition declining or worsening    Shift Goals  Clinical Goals: maintain safety seizure precautions      Problem: Knowledge Deficit - L&D  Goal: Patient and family/caregivers will demonstrate understanding of plan of care, disease process/condition, diagnostic tests and medications  Outcome: Progressing     Problem: Neuro Status  Goal: Neuro status will remain stable or improve  Outcome: Progressing     Problem: Self Care  Goal: Patient will have the ability to perform ADLs independently or with assistance (bathe, groom, dress, toilet and feed)  Outcome: Progressing

## 2024-01-18 ENCOUNTER — PHARMACY VISIT (OUTPATIENT)
Dept: PHARMACY | Facility: MEDICAL CENTER | Age: 29
End: 2024-01-18
Payer: COMMERCIAL

## 2024-01-18 VITALS
OXYGEN SATURATION: 94 % | HEART RATE: 101 BPM | RESPIRATION RATE: 16 BRPM | WEIGHT: 173.94 LBS | TEMPERATURE: 97.8 F | SYSTOLIC BLOOD PRESSURE: 136 MMHG | BODY MASS INDEX: 29.86 KG/M2 | DIASTOLIC BLOOD PRESSURE: 96 MMHG

## 2024-01-18 PROCEDURE — 700102 HCHG RX REV CODE 250 W/ 637 OVERRIDE(OP): Performed by: OBSTETRICS & GYNECOLOGY

## 2024-01-18 PROCEDURE — G0378 HOSPITAL OBSERVATION PER HR: HCPCS

## 2024-01-18 PROCEDURE — RXMED WILLOW AMBULATORY MEDICATION CHARGE: Performed by: OBSTETRICS & GYNECOLOGY

## 2024-01-18 PROCEDURE — A9270 NON-COVERED ITEM OR SERVICE: HCPCS | Performed by: OBSTETRICS & GYNECOLOGY

## 2024-01-18 RX ORDER — LEVETIRACETAM 750 MG/1
750 TABLET ORAL 2 TIMES DAILY
Qty: 60 TABLET | Refills: 12 | Status: SHIPPED | OUTPATIENT
Start: 2024-01-18 | End: 2024-02-20

## 2024-01-18 RX ORDER — LEVETIRACETAM 500 MG/1
500 TABLET ORAL 2 TIMES DAILY
Status: DISCONTINUED | OUTPATIENT
Start: 2024-01-18 | End: 2024-01-18

## 2024-01-18 RX ORDER — LEVETIRACETAM 500 MG/1
750 TABLET ORAL 2 TIMES DAILY
Status: DISCONTINUED | OUTPATIENT
Start: 2024-01-18 | End: 2024-01-18 | Stop reason: HOSPADM

## 2024-01-18 RX ORDER — ACETAMINOPHEN 325 MG/1
650 TABLET ORAL EVERY 4 HOURS PRN
Qty: 30 TABLET | Refills: 0 | Status: SHIPPED | OUTPATIENT
Start: 2024-01-18

## 2024-01-18 RX ADMIN — LEVETIRACETAM 750 MG: 500 TABLET, FILM COATED ORAL at 07:54

## 2024-01-18 NOTE — PROGRESS NOTES
0700 - Report from ALONA Braga. Pt sleeping at this time.   0750 - pt sitting up in bed pumping. She reports having a good night, slept well. Bleeding is scant. No seizure activity. POC discussed for d/c home today  0930 - Family a bedside  1041 - Pt is pumping  1100 - D/C instruction discussed with pt and Fob at bedside. Follow up instructions discussed. Pt agrees.   1111 - Pt ambulated off unit in stable condition with family at side with belongings & medications to home.

## 2024-01-18 NOTE — CARE PLAN
The patient is Stable - Low risk of patient condition declining or worsening    Shift Goals  Clinical Goals: no seizures  Patient Goals: get off oxygen  Family Goals: discharge tomorrow    Progress made toward(s) clinical / shift goals:    Problem: Risk for Excess Fluid Volume  Goal: Patient will demonstrate pulse, blood pressure and neurologic signs within expected ranges and without any respiratory complications  Outcome: Progressing     Problem: Neuro Status  Goal: Neuro status will remain stable or improve  Outcome: Progressing     Problem: Self Care  Goal: Patient will have the ability to perform ADLs independently or with assistance (bathe, groom, dress, toilet and feed)  Outcome: Progressing     Problem: Knowledge Deficit - Postpartum  Goal: Patient will verbalize and demonstrate understanding of self and infant care  Outcome: Progressing     Problem: Psychosocial - Postpartum  Goal: Patient will verbalize and demonstrate effective bonding and parenting behavior  Outcome: Progressing     Problem: Altered Physiologic Condition  Goal: Patient physiologically stable as evidenced by normal lochia, palpable uterine involution and vitals within normal limits  Outcome: Progressing     Problem: Infection - Postpartum  Goal: Postpartum patient will be free of signs and symptoms of infection  Outcome: Progressing     Problem: Bowel Elimination - Post Surgical  Goal: Patient will resume regular bowel sounds and function with no discomfort or distention  Outcome: Progressing     Problem: Knowledge Deficit - L&D  Goal: Patient and family/caregivers will demonstrate understanding of plan of care, disease process/condition, diagnostic tests and medications  Outcome: Met     Problem: Respiratory/Oxygenation Function Post-Surgical  Goal: Patient will achieve/maintain normal respiratory rate/effort  Outcome: Met     Problem: Early Mobilization - Post Surgery  Goal: Early mobilization post surgery  Outcome: Met       Patient is  not progressing towards the following goals:N/A

## 2024-01-18 NOTE — DISCHARGE INSTRUCTIONS
Call for a temp >100.4, heavy vaginal bleeding, foul smelling discharge.  Pelvic rest x 6 weeks.  Ambulate TID     Epilepsy  Epilepsy is a condition in which a person has repeated seizures over time. A seizure is a sudden burst of abnormal electrical and chemical activity in the brain. Seizures can cause a change in attention, behavior, or ability to remain awake and alert (altered mental status).  Epilepsy increases a person's risk of falls, accidents, and injury. It can also lead to:  Depression.  Poor memory.  Sudden unexplained death in epilepsy (SUDEP). This is rare, and its cause is not known.  Most people with epilepsy lead normal lives.  What are the causes?  This condition may be caused by:  A head injury or injury that happens at birth.  A high fever during childhood.  A stroke.  Bleeding into or around the brain.  Certain medicines and drugs.  Having too little oxygen for a long period of time.  Abnormal brain development.  Certain conditions. These may include:  Brain infection.  Brain tumor.  Conditions that are passed from parent to child (are hereditary).  Many times, the cause of this condition is not known.  What are the signs or symptoms?  Symptoms of a seizure vary greatly from person to person. They may include:  Uncontrollable shaking (convulsions) with fast, jerking movements of the arms or legs.  Stiffening of the body.  Breathing problems.  Confusion, staring, or unresponsiveness.  Head nodding, eye blinking or fluttering, or rapid eye movements.  Drooling, grunting, or making clicking sounds with your mouth.  Loss of bladder control and bowel control.  Some people have symptoms right before a seizure happens (aura) and right after a seizure happens. Symptoms of an aura include:  Fear or anxiety.  Nausea.  Vertigo. This is a feeling like:  You are moving when you are not.  Your surroundings are moving when they are not.  Déjà vu. This is a feeling of having seen or heard something  before.  Odd tastes or smells.  Changes in vision, such as seeing flashing lights or spots.  Symptoms that follow a seizure include:  Confusion.  Sleepiness.  Headache.  Sore muscles.  How is this diagnosed?  This condition is diagnosed based on:  Your symptoms.  Your medical history.  A physical exam.  A neurological exam. This includes checking your strength, reflexes, coordination, and sensations.  Tests. These may include:  A painless test that records your brain waves (electroencephalogram, orEEG).  MRI.  CT scan.  A test of your spinal fluid (lumbar puncture, or spinal tap).  Blood tests to check for signs of infection or abnormal blood chemistry.  How is this treated?  Treatment can control seizures. Some types of epilepsy will need lifelong treatment, and some types go away in time.  This condition may be treated with:  Medicines to control seizures and prevent future seizures.  A vagus nerve stimulator. This is a device that is implanted in the chest. The device sends electrical impulses to the vagus nerve and to the brain to prevent seizures. This treatment may be recommended if medicines do not help.  Brain surgery. There are several kinds of surgeries that may be done to stop seizures from happening or to reduce how often seizures happen.  Blood tests. You may need to have blood tests regularly to check that you are getting the right amount of medicine.  The ketogenic diet. This diet involves foods that are low in carbohydrates and high in fat.  When this condition has been diagnosed, it is important to begin treatment as soon as possible. For some people, epilepsy goes away in time.  Follow these instructions at home:  Medicines  Take over-the-counter and prescription medicines only as told by your health care provider.  Avoid any substances that may prevent your medicine from working properly, such as alcohol.  Activity  Get enough rest. Lack of sleep can make seizures more likely to happen.  Follow  instructions from your health care provider about driving, swimming, and doing any other activities that would be dangerous if you had a seizure. If you live in the [place] check with your local department of motor vehicles (DMV) to find out about local driving laws. Each state has specific rules about when you can legally start driving again.  Educating others    Teach friends and family what to do if you have a seizure. They should:  Help you get down to the ground to prevent a fall.  Cushion your head and body.  Loosen any tight clothing around your neck.  Turn you on your side. If vomiting occurs, this helps keep your airway clear.  Not hold you down. Holding you down will not stop the seizure.  Not put anything in your mouth.  Stay with you until you recover.  Know whether or not you need emergency care.  General instructions  Avoid anything that has ever triggered a seizure for you.  Keep a seizure diary. Record what you remember about each seizure, especially anything that might have triggered the seizure.  Keep all follow-up visits. This is important.  Where to find more information  Epilepsy Foundation: epilepsy.com  International League Against Epilepsy: ilae.org  Contact a health care provider if:  Your seizure pattern changes.  You continue to have seizures with treatment.  You have symptoms of an infection or illness. Either of these might increase your risk of having a seizure.  You are unable to take your medicine.  Get help right away if:  You have:  A seizure that does not stop after 5 minutes.  Several seizures in a row without a complete recovery between seizures.  A seizure that makes it harder to breathe.  A seizure that leaves you unable to speak or use a part of your body.  You did not wake up right away after a seizure.  You injure yourself during a seizure.  You have confusion or pain right after a seizure.  These symptoms may represent a serious problem that is an emergency. Do not wait to  see if the symptoms will go away. Get medical help right away. Call your local emergency services (113 in the [place] Do not drive yourself to the hospital.  If you ever feel like you may hurt yourself or others, or have thoughts about taking your own life, get help right away. Go to your nearest emergency department or:  Call your local emergency services (036 in the [place]  Call a suicide crisis helpline, such as the National Suicide Prevention Lifeline at 1-647.910.3961 or 863 in the [place] This is open 24 hours a day in the [place]  Text the Crisis Text Line at 668248 (in the [place]  Summary  Epilepsy is a condition in which a person has repeated seizures over time. Some types of epilepsy will need lifelong treatment, and some types go away in time.  Seizures can cause many symptoms, such as brief staring and uncontrollable shaking or fast movements of the arms or legs.  Treatment can control seizures. Take over-the-counter and prescription medicines only as told by your health care provider.  Follow instructions from your health care provider about driving, swimming, and doing any other activities that would be dangerous if you had a seizure.  Teach friends and family what to do if you have a seizure.  This information is not intended to replace advice given to you by your health care provider. Make sure you discuss any questions you have with your health care provider.  No driving.  No handling weapons.  No swimming or tub bathing alone.  No working from heights.  No other high risk activity that may result in harm to self or others in the event of a seizure. Avoid sleep deprivation and alcohol.   I discussed in detail the indications, potential benefits and side effects as well as alternatives for Keppra.  I also discussed seizure precautions, potential risks associated with seizures (SUDEP, status epilepticus and injury) and safety.  Also discussed breast-feeding and seizure medication.  Patient will continue to  breast-feed.  We discussed measures to help avoid sleep deprivation such as having her partner continue to help with the baby in the overnight hours as well as pumping. Advise supervision when caring for infant during the acute phase until cleared to drive and to avoid co-sleeping with infant.  - Avoid bathing alone, bathing the baby when another adult is not present, no driving, no heavy machinery, avoid heights, no sleeping with baby, and avoid flashing lights until evaluated by neurology outpatient.  - Recommend help at night  - Recommend minimal disruptions  - Patient should not be left alone with the baby  Document Revised: 07/13/2022 Document Reviewed: 06/21/2021  Elsevier Patient Education © 2023 [employer]

## 2024-01-18 NOTE — DISCHARGE SUMMARY
Discharge Summary:      Vika Alvarez    Admit Date:   2024  Discharge Date:  2024     Admitting diagnosis:  History of idiopathic seizure [Z87.898]  Discharge Diagnosis: Status post neurology consultation and IV keppra        History:  No past medical history on file.  OB History    Para Term  AB Living   2 2 2     2   SAB IAB Ectopic Molar Multiple Live Births           0 2      # Outcome Date GA Lbr Leon/2nd Weight Sex Delivery Anes PTL Lv   2 Term 24 40w1d / 00:09 3.295 kg (7 lb 4.2 oz) M Vag-Spont EPI N REGLA   1 Term                 Patient has no known allergies.  Patient Active Problem List    Diagnosis Date Noted    History of idiopathic seizure 2024    Indication for care in labor or delivery 2024        Hospital Course:   28 y.o. , now para 2, was admitted with the above mentioned diagnosis, underwent consultation with neurology, brain MRI, EEG, and initiation of AED IV. Patient hospital course was unremarkable, with progressive advancement in diet , ambulation and toleration of oral analgesia. Patient without complaints today and desires discharge.      Vitals:    24 0423 24 0430 24 0500 24 0510   BP:       Pulse: 73 89 (!) 56 83   Resp:       Temp:       TempSrc:       SpO2: 94% 95% 90% 94%   Weight:           Current Facility-Administered Medications   Medication Dose    levETIRAcetam (Keppra) tablet 750 mg  750 mg    acetaminophen (Tylenol) tablet 650 mg  650 mg       Exam:    /84   Pulse 83   Temp 36.4 °C (97.6 °F) (Temporal)   Resp 17   Wt 78.9 kg (173 lb 15.1 oz)   SpO2 94%     A, A, and O x 3 NAD    FF u/1    No c/c/e     Labs:  Recent Labs     01/15/24  1121 24   WBC 12.0* 10.8   RBC 4.06* 4.25   HEMOGLOBIN 11.6* 12.2   HEMATOCRIT 35.4* 36.6*   MCV 87.2 86.1   MCH 28.6 28.7   MCHC 32.8 33.3   RDW 44.9 44.5   PLATELETCT 153* 196   MPV 11.6 10.6        Activity:   Discharge to home  Pelvic Rest x 6  weeks    Assessment:  Stable and ready for discharge.  Ambulate TID.   to be with patient 24 hours per day x 4 weeks per neurology.     Follow up: [facility] in 2 weeks and Dr. Worthy in 2 weeks.     Discharge Meds:   Current Outpatient Medications   Medication Sig Dispense Refill    levETIRAcetam (KEPPRA) 750 MG tablet Take 1 Tablet by mouth 2 times a day. 60 Tablet 12    acetaminophen (TYLENOL) 325 MG Tab Take 2 Tablets by mouth every four hours as needed for Moderate Pain. 30 Tablet 0    levETIRAcetam (KEPPRA) 500 MG Tab Take 1 Tablet by mouth 2 times a day for 90 days. 180 Tablet 0     Patient only to take Keppra 750 mg by mouth BID.    Jovita Fong M.D.

## 2024-02-07 ENCOUNTER — TELEPHONE (OUTPATIENT)
Dept: NEUROLOGY | Facility: MEDICAL CENTER | Age: 29
End: 2024-02-07
Payer: COMMERCIAL

## 2024-02-07 NOTE — TELEPHONE ENCOUNTER
02/07/24  Called patient to move up her appointment per Dr Au. I left patient a voicemail with my direct number in case she had any questions. HL

## 2024-02-20 ENCOUNTER — TELEPHONE (OUTPATIENT)
Dept: NEUROLOGY | Facility: MEDICAL CENTER | Age: 29
End: 2024-02-20

## 2024-02-20 ENCOUNTER — OFFICE VISIT (OUTPATIENT)
Dept: NEUROLOGY | Facility: MEDICAL CENTER | Age: 29
End: 2024-02-20
Attending: STUDENT IN AN ORGANIZED HEALTH CARE EDUCATION/TRAINING PROGRAM
Payer: COMMERCIAL

## 2024-02-20 VITALS
WEIGHT: 166.89 LBS | SYSTOLIC BLOOD PRESSURE: 122 MMHG | HEIGHT: 64 IN | OXYGEN SATURATION: 97 % | HEART RATE: 62 BPM | TEMPERATURE: 96.8 F | DIASTOLIC BLOOD PRESSURE: 56 MMHG | BODY MASS INDEX: 28.49 KG/M2

## 2024-02-20 DIAGNOSIS — G40.309 GENERALIZED EPILEPSY (HCC): ICD-10-CM

## 2024-02-20 PROCEDURE — 3078F DIAST BP <80 MM HG: CPT | Performed by: STUDENT IN AN ORGANIZED HEALTH CARE EDUCATION/TRAINING PROGRAM

## 2024-02-20 PROCEDURE — 99215 OFFICE O/P EST HI 40 MIN: CPT | Performed by: STUDENT IN AN ORGANIZED HEALTH CARE EDUCATION/TRAINING PROGRAM

## 2024-02-20 PROCEDURE — 99211 OFF/OP EST MAY X REQ PHY/QHP: CPT | Performed by: STUDENT IN AN ORGANIZED HEALTH CARE EDUCATION/TRAINING PROGRAM

## 2024-02-20 PROCEDURE — 3074F SYST BP LT 130 MM HG: CPT | Performed by: STUDENT IN AN ORGANIZED HEALTH CARE EDUCATION/TRAINING PROGRAM

## 2024-02-20 RX ORDER — LEVETIRACETAM 500 MG/1
500 TABLET ORAL 2 TIMES DAILY
Qty: 180 TABLET | Refills: 0 | Status: SHIPPED | OUTPATIENT
Start: 2024-02-20 | End: 2024-05-20

## 2024-02-20 RX ORDER — NORETHINDRONE 0.35 MG/1
TABLET ORAL
COMMUNITY
Start: 2024-02-13

## 2024-02-20 ASSESSMENT — FIBROSIS 4 INDEX: FIB4 SCORE: 0.74

## 2024-02-20 NOTE — TELEPHONE ENCOUNTER
Received Refill PA request via [facility]  for Levetiracetam (Keppra) 500 MG Tabs . (Quantity:180, Day Supply:90) - Copay $9.40 (No PA req'd)     Insurance: [facility] FEP  Member ID:  [id number]  BIN: [id number]  PCN: [id number]  Group: [id number]     Ran Test claim via [place] [facility] medication Pays for a $9.40 copay. Will outreach to patient to offer specialty pharmacy services and or release to preferred pharmacy

## 2024-02-20 NOTE — PATIENT INSTRUCTIONS
-Adjust Keppra to 500 mg twice daily    Please let our office know if you have any changes in your seizure frequency and/characteristics. Otherwise, please keep the diary of your events and bring it with you at the time of your next follow up visit with our office.     Please take vitamin D3 [id number] international units daily.     Please take folic acid 1 mg daily. This is an over-the-counter supplement that is recommended to prevent certain developmental problems in your baby, in case you become pregnant in the future.    Please let our office know as soon as you become pregnant or plan to become pregnant.    If you are caring for a baby/young child, please make sure to be sitting on a soft surface while holding your baby/young child, so in case you have a seizure, your baby/young child is not injured due to fall.     Please let us know if you observe that your baby is excessively sleepy/has other changes and the pediatrician feels that there are no other explanations except possible adverse effects of antiseizure medication(s) your are taking while nursing your baby.     Please note that the following might precipitate seizures: missed doses of antiseizure medications, being sick with fever, stress, fatigue, sleep deprivation, not eating regularly, not drinking enough water, drinking too much alcohol, stopping alcohol suddenly if you are currently using it on a regular/daily basis, and/or using recreational drugs, among others.    Please note that the following might lead to an injury, potentially a life-threatening injury, in case you have a seizure and/or lose awareness while:   - being in a large body of water by yourself, such as bath, pool, lake, ocean, among others (risk of drowning)   - being on unprotected heights (risk of fall)   - being around and/or operating heavy machinery (risk of injury)   - being around open fire/hot surfaces (risk of burns)   - any other activities/circumstances, in which if you  lose awareness, you might injure yourself and/or others.    Please call for help (crisis line and/or 911) in case you have thoughts of harming yourself and/or others.    Please abstain from driving until further notice.    ------------------------------------------------------------------------------------------  Instructions for your family/caregivers:  Please call 911 if the patient has a seizure longer than 2-3 minutes, if seizures are back to back without her recovering to her baseline, or she does not start recovering within 5-10 minutes after the seizure stops. During the seizure - please turn her on her side, please make sure her head is protected (for example, you should put a pillow under her head, if one is available), and please do not put anything in her mouth.   -------------------------------------------------------------------------------------------    It is important that your seizures are well controlled and you have none or have them rarely. In addition to avoiding injury related to breakthrough seizures, frequent seizures increase risk of SUDEP (sudden unexpected death in epilepsy), where a person goes into a seizure and then never wakes up - this is a rare complication of seizure disorder; one of the best ways to prevent it is to control your seizures well.     Due to the high volume of patients we are trying to help, your physician will not be able to respond by phone or in scroll kithart to your routine concerns between appointments.  This does not reflect a lack of interest or concern for you or your diagnosis.  Please bring these questions and concerns to your appointment where your physician can answer.  Please relay more pressing concerns to our office, either via scroll kithart, or by phone; if not able to reach us please visit nearby Urgent Care Center or Emergency Department.  If any emergent medical needs, please seek emergent medical help and/or call 911.    Please note that we are not able to fill  out paperwork that might be related to your work, utility company, disability, and/or driving, among others, in between the visits.  Please schedule a dedicated appointment to address your paperwork, so we can do that in a timely manner.  This is not due to lack of concern or interest for your disease-related work/administrative problems, but to make sure that we provide the best possible care and to fill out your paperwork in a correct and timely manner.    Thank you for entrusting your neurological care to [facility] and we look forward to continuing to serve you.

## 2024-02-20 NOTE — PROGRESS NOTES
"[facility]  New patient visit    Patient name: Vika Alvarez  YOB: 1995  MRN: [id number]  Referring provider: Abdulaziz Youssef M.D.  [address] [place] [id number]   Date of visit: 2024     CC: Seizure      HPI:    Vika Alvarez is a 28 y.o. woman who is referred for an initial neurologic consultation for seizures.     She was admitted in 2024 for delivery of her second child. Two days after delivery on 2024 she had been breastfeeding her baby in a chair, then exhibited sudden onset head turn followed by generalized convulsion. Neurology followed her while hospitalized.     Prior to delivering she had been waking up between 2-3 AM for about an hour then go back to sleep. She was sleeping 7-9 hours at that time. Sleep has been back to normal for a  in the context of having a .    When taking the medication twice daily she had headaches, fatigue, irritability, depressed mood. She is currently taking 750 mg in the morning.     No further seizures.     Onset: was evaluated at age 10, had not been diagnosed with seizures  Semiology: 1) During childhood she would have episodes of eye fluttering and zoning out, approximately 8 total. Family would tell her she needed to go to sleep. 2) most recent seizure 2024  Frequency: 8 total events in childhood; 2) most recent seizures - she turned her head to the right side. She had minor whole body \"tremors\" with muscle rigidity. No large convulsion.  assisted her to the ground. (+) tongue biting - bilateral. No urinary incontinence  Duration of spells: 3-5 minutes, 1-2 minutes for latter two seizures.   Triggers: sleep deprivation, stress  Current treatment: Keppra 750 mg in the morning  Previous treatments: none  Status Epilepticus: no      Last seizure: 2024    Mood: no hx of suicidal ideation or mood disturbance    Side effects: none identified    Barriers to taking medication " appropriately: none    Driving: not driving currently    Vitamin D: not taking    Women's issues in epilepsy: progesterone OCP    Risk factors for epileptic seizures:  Normal birth history, no complications, born at term.   No history of significant head trauma.   No history of stroke or meningitis.  No family history of epilepsy.   No febrile seizures.     She smokes cigarettes occasionally. Prior to pregnancy she drank socially, currently drinks 1-2 beers a couple times a week socially. No drug use.     She had Bell's palsy between 2nd and 3rd trimesters.     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       Past Medical History: No past medical history on file.    Past Surgical History: History reviewed. No pertinent surgical history. History of wisdom tooth surgery.     Social History:   Social History     Socioeconomic History    Marital status:      Spouse name: Not on file    Number of children: Not on file    Years of education: Not on file    Highest education level: Not on file   Occupational History    Not on file   Tobacco Use    Smoking status: Some Days     Types: Cigarettes    Smokeless tobacco: Never   Vaping Use    Vaping Use: Not on file   Substance and Sexual Activity    Alcohol use: Not Currently    Drug use: Not Currently    Sexual activity: Not on file   Other Topics Concern    Not on file   Social History Narrative    Not on file     Social Determinants of Health     Financial Resource Strain: Not on file   Food Insecurity: Not on file   Transportation Needs: Not on file   Physical Activity: Not on file   Stress: Not on file   Social Connections: Not on file   Intimate Partner Violence: Not on file   Housing Stability: Not on file       Family Hx: History reviewed. No pertinent family history.    Current Medications:   Current Outpatient Medications:     LISANDRO 0.35 MG tablet, TAKE 1 TABLET BY MOUTH EVERY DAY FOR 84 DAYS, Disp: , Rfl:     levetiracetam (KEPPRA) 750 MG tablet, Take 1 Tablet  by mouth 2 times a day., Disp: 60 Tablet, Rfl: 12    acetaminophen (TYLENOL) 325 MG Tab, Take 2 Tablets by mouth every four hours as needed for Moderate Pain., Disp: 30 Tablet, Rfl: 0    cyclobenzaprine (FLEXERIL) 10 mg Tab, Take 1 Tab by mouth 3 times a day as needed for Muscle Spasms. (Patient not taking: Reported on 2/20/2024), Disp: 20 Tab, Rfl: 0    Allergies: No Known Allergies      Physical Exam:   Ambulatory Vitals  Vitals:    02/20/24 1424   BP: 122/56   Pulse: 62   Temp: 36 °C (96.8 °F)   SpO2: 97%       Constitutional: Well-developed, well-nourished, good hygiene. Appears stated age.  Respiratory: normal respiratory effort  Skin: Warm, dry, intact. No rashes observed.  Neurologic:   Mental Status: Awake, alert, oriented x 3.   Speech: Fluent with normal prosody.   Memory: Able to recall 3 words at 1 minute and 5 minutes. Able to recall recent and remote events accurately.    Concentration: Attentive. Able to focus on history and follow multi-step commands.   Fund of Knowledge: Appropriate.   Cranial Nerves:    CN II: PERRL     CN III, IV, VI: EOMI without nystagmus    CN V: Facial sensation intact and symmetric in all 3 trigeminal distributions    CN VII: No facial asymmetry    CN VIII: Hearing intact to voice    CN IX and X: Palate elevates symmetrically, gag reflex not tested    CN XI: Symmetric shoulder shrug     CN XII: Tongue midline   Motor: 5/5 in upper and lower extremities bilaterally   Sensory: Intact light touch, vibration and temperature diffusely    Coordination: No evidence of past-pointing on finger to nose testing, no dysdiadochokinesia. Heel to shin intact.    DTR's: 2+ throughout without clonus.    Babinski: Toes downgoing bilaterally.   Gait: ambulates steadily without assistive device. Romberg negative. Able to perform tandem gait.    Movements: No resting tremors or abnormal movements observed.   Musculoskeletal:    Strength: as above   Tone: Normal bulk and tone   Joints: No  swelling    Studies:      Labs reviewed:      Imaging:   MRI Brain wwo contrast 2024  IMPRESSION:        Contrast enhanced brain  MRI within normal limits.    EEG Results:   Routine EEG 2024  INTERPRETATION:   Abnormal video EEG recording in the awake state(s):  1) Frequent 2-3 second bursts of approximately 3 Hz high amplitude spike and polyspike wave discharges  2) Occasional generalized bifrontally predominant sharp waves, a finding likely representing generalized spike wave fragments.   3) A photoparoxysmal response was captured at multiple frequencies, of similar morphology to the generalized spike and polyspike waves seen outside of photic stimulation.     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        Assessment/Plan:   Vika Alvarez is a 28 y.o. woman with a history of episodes of eye fluttering and upgaze during childhood and recent witnessed generalized tonic-clonic seizure in 2024 with resultant hospital admission being seen to establish care in outpatient neurology clinic. A possible provoking factor for the recent seizure was sleep deprivation/stress as it occurred 2 days after delivering her second child. 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    She had been prescribed Keppra 750 mg BID at the time of hospitalization and decreased the dose to 750 mg in the morning because she experienced headaches, fatigue, irritability, depressed mood at the twice daily dosing. I have recommended she adjust the dose to 500 mg twice daily for adequate protection against seizures as the once daily dosing is unlikely to provide adequate protection long-term. If the side effects return with this dosing change, will consider switching to a different anti-seizure medication.     Patient counseled on seizure safety, safety with  including to avoid  wearable devices strapping baby to her body as well as co-sleeping. No driving until 3 months seizure-free per [facility] DMV laws. Counseled to start vitamin D and folic acid 1 mg daily.     Advised patient to contact the clinic if there are any breakthrough seizures. Ambulatory EEG was ordered to characterize interictal burden on medication.     Patient instructions:  -Adjust Keppra to 500 mg twice daily    Please let our office know if you have any changes in your seizure frequency and/characteristics. Otherwise, please keep the diary of your events and bring it with you at the time of your next follow up visit with our office.     Please take vitamin D3 2043-3447 international units daily.     Please take folic acid 1 mg daily. This is an over-the-counter supplement that is recommended to prevent certain developmental problems in your baby, in case you become pregnant in the future.    Please let our office know as soon as you become pregnant or plan to become pregnant.    If you are caring for a baby/young child, please make sure to be sitting on a soft surface while holding your baby/young child, so in case you have a seizure, your baby/young child is not injured due to fall.     Please let us know if you observe that your baby is excessively sleepy/has other changes and the pediatrician feels that there are no other explanations except possible adverse effects of antiseizure medication(s) your are taking while nursing your baby.     Please note that the following might precipitate seizures: missed doses of antiseizure medications, being sick with fever, stress, fatigue, sleep deprivation, not eating regularly, not drinking enough water, drinking too much alcohol, stopping alcohol suddenly if you are currently using it on a regular/daily basis, and/or using recreational drugs, among others.    Please note that the following might lead to an injury, potentially a life-threatening injury, in case you have a  seizure and/or lose awareness while:   - being in a large body of water by yourself, such as bath, pool, lake, ocean, among others (risk of drowning)   - being on unprotected heights (risk of fall)   - being around and/or operating heavy machinery (risk of injury)   - being around open fire/hot surfaces (risk of burns)   - any other activities/circumstances, in which if you lose awareness, you might injure yourself and/or others.    Please call for help (crisis line and/or 911) in case you have thoughts of harming yourself and/or others.    Please abstain from driving until further notice.    ------------------------------------------------------------------------------------------  Instructions for your family/caregivers:  Please call 911 if the patient has a seizure longer than 2-3 minutes, if seizures are back to back without her recovering to her baseline, or she does not start recovering within 5-10 minutes after the seizure stops. During the seizure - please turn her on her side, please make sure her head is protected (for example, you should put a pillow under her head, if one is available), and please do not put anything in her mouth.   -------------------------------------------------------------------------------------------    It is important that your seizures are well controlled and you have none or have them rarely. In addition to avoiding injury related to breakthrough seizures, frequent seizures increase risk of SUDEP (sudden unexpected death in epilepsy), where a person goes into a seizure and then never wakes up - this is a rare complication of seizure disorder; one of the best ways to prevent it is to control your seizures well.     Due to the high volume of patients we are trying to help, your physician will not be able to respond by phone or in MyChart to your routine concerns between appointments.  This does not reflect a lack of interest or concern for you or your diagnosis.  Please bring  these questions and concerns to your appointment where your physician can answer.  Please relay more pressing concerns to our office, either via MyChart, or by phone; if not able to reach us please visit nearby Urgent Care Center or Emergency Department.  If any emergent medical needs, please seek emergent medical help and/or call 911.    Please note that we are not able to fill out paperwork that might be related to your work, utility company, disability, and/or driving, among others, in between the visits.  Please schedule a dedicated appointment to address your paperwork, so we can do that in a timely manner.  This is not due to lack of concern or interest for your disease-related work/administrative problems, but to make sure that we provide the best possible care and to fill out your paperwork in a correct and timely manner.    Thank you for entrusting your neurological care to [facility] and we look forward to continuing to serve you.           Follow up in approximately 8 weeks.     Katty Au M.D.   Diplomate, Neurology with Special Qualification in Epilepsy, American Board of Psychiatry and Neurology   of Clinical Neurology, [facility]  Level III Epilepsy Center, Department of Neurology at [facility]   2/20/2024      During today's encounter we discussed available treatment options and their individual side effect profiles. Total encounter time caring for patient today 50 minutes.

## 2024-02-21 ENCOUNTER — TELEPHONE (OUTPATIENT)
Dept: PHARMACY | Facility: MEDICAL CENTER | Age: 29
End: 2024-02-21
Payer: COMMERCIAL

## 2024-02-21 NOTE — TELEPHONE ENCOUNTER
Spoke with patient and offered services-she declined and would like medications filled with [facility].    Yudith Ma  Rx Coordinator   [phone]

## 2024-04-24 ENCOUNTER — TELEPHONE (OUTPATIENT)
Dept: NEUROLOGY | Facility: MEDICAL CENTER | Age: 29
End: 2024-04-24
Payer: COMMERCIAL

## 2024-04-24 NOTE — TELEPHONE ENCOUNTER
NEUROLOGY PATIENT PRE-VISIT PLANNING     Patient was NOT contacted to complete PVP.    Patient Appointment is scheduled as: Established Patient     Is visit type and length scheduled correctly? Yes    [employer] Patient is checked in Patient Demographics? Yes    3.   Is referral attached to visit? Yes    4. Were records received from referring provider? Yes    4. Patient was NOT contacted to have someone accompany them to visit.     5. Is this appointment scheduled as a Hospital Follow-Up?  Yes    6. Does the patient require any pre procedure or post procedure follow up? No    7. If any orders were placed at last visit or intended to be done for this visit do we have Results/Consult Notes? Yes  Labs -  Labs done in hospital  Imaging - Labs done in hospital  Referrals - No referrals were placed    8. If patient appointment is for Botox - is order pended for provider? N/A    9. Was Plan Assessment from last Neurology Office Visit Reviewed?  Yes

## 2024-04-29 ENCOUNTER — TELEPHONE (OUTPATIENT)
Dept: NEUROLOGY | Facility: MEDICAL CENTER | Age: 29
End: 2024-04-29

## 2024-04-29 ENCOUNTER — OFFICE VISIT (OUTPATIENT)
Dept: NEUROLOGY | Facility: MEDICAL CENTER | Age: 29
End: 2024-04-29
Attending: STUDENT IN AN ORGANIZED HEALTH CARE EDUCATION/TRAINING PROGRAM
Payer: COMMERCIAL

## 2024-04-29 VITALS
HEART RATE: 66 BPM | DIASTOLIC BLOOD PRESSURE: 68 MMHG | BODY MASS INDEX: 28.53 KG/M2 | OXYGEN SATURATION: 97 % | SYSTOLIC BLOOD PRESSURE: 122 MMHG | RESPIRATION RATE: 16 BRPM | TEMPERATURE: 98.3 F | HEIGHT: 64 IN | WEIGHT: 167.11 LBS

## 2024-04-29 DIAGNOSIS — G40.309 GENERALIZED EPILEPSY (HCC): ICD-10-CM

## 2024-04-29 PROCEDURE — 99211 OFF/OP EST MAY X REQ PHY/QHP: CPT | Performed by: STUDENT IN AN ORGANIZED HEALTH CARE EDUCATION/TRAINING PROGRAM

## 2024-04-29 RX ORDER — LEVETIRACETAM 500 MG/1
500 TABLET ORAL 2 TIMES DAILY
Qty: 180 TABLET | Refills: 3 | Status: SHIPPED | OUTPATIENT
Start: 2024-04-29 | End: 2025-04-24

## 2024-04-29 ASSESSMENT — FIBROSIS 4 INDEX: FIB4 SCORE: 0.74

## 2024-04-29 NOTE — TELEPHONE ENCOUNTER
Received Refill PA request via [employer]  for Levetiracetam (Keppra) 500 MG Tabs. (Quantity:180, Day Supply:90) - Copay $9.40  - NO PA REQUIRED     Insurance: [facility] FEP   Member ID:  [id number]  BIN: [id number]  PCN: [id number]  Group: [id number]     Ran Test claim via [employer] for a $9.40 copay. Will outreach to patient to offer specialty pharmacy services and or release to preferred pharmacy

## 2024-04-29 NOTE — PATIENT INSTRUCTIONS
-For side effects of irritability on Keppra, we can switch medicines if it is an issue. Vimpat (lacosamide) and Lamictal (lamotrigine) are potential options among others.

## 2024-04-29 NOTE — PROGRESS NOTES
"[facility]  Follow up visit    Patient name: Vika Alvarez  YOB: 1995  MRN: [id number]  Date of visit: 2024     Background:    Vika Alvarez is a 28 y.o. woman being seen in follow up for seizures.     Details of history:  She was admitted in 2024 for delivery of her second child. Two days after delivery on 2024 she had been breastfeeding her baby in a chair, then exhibited sudden onset head turn followed by generalized convulsion. Neurology followed her while hospitalized.      Prior to delivering she had been waking up between 2-3 AM for about an hour then go back to sleep. She was sleeping 7-9 hours at that time. Sleep has been back to normal for a  in the context of having a .     When taking the medication twice daily she had headaches, fatigue, irritability, depressed mood. She is currently taking 750 mg in the morning.      No further seizures.      Onset: was evaluated at age 10, had not been diagnosed with seizures  Semiology: 1) During childhood she would have episodes of eye fluttering and zoning out, approximately 8 total. Family would tell her she needed to go to sleep. 2) most recent seizure 2024  Frequency: 8 total events in childhood; 2) most recent seizures - she turned her head to the right side. She had minor whole body \"tremors\" with muscle rigidity. No large convulsion.  assisted her to the ground. (+) tongue biting - bilateral. No urinary incontinence  Duration of spells: 3-5 minutes, 1-2 minutes for latter two seizures.   Triggers: sleep deprivation, stress  Previous treatments: self-decreased to Keppra 750 mg in AM.  Status Epilepticus: no    No history of twitching episodes or dropping objects unexpectedly.      Risk factors for epileptic seizures:  Normal birth history, no complications, born at term.   No history of significant head trauma.   No history of stroke or meningitis.  No family history of " epilepsy.   No febrile seizures.      She smokes cigarettes occasionally. Prior to pregnancy she drank socially, currently drinks 1-2 beers a couple times a week socially. No drug use.      She had Bell's palsy between 2nd and 3rd trimesters.      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         Interval history:  Patient is accompanied to clinic by her  who supplements the history.    No interval seizures.    Current treatment: Keppra 500 mg BID    Last seizure: 1/16/2024     Mood: no hx of suicidal ideation or mood disturbance     Side effects: slightly more irritable than usual, not to the point she wants to try a different medication at this moment     Barriers to taking medication appropriately: none, has forgotten nighttime dose 3x in interim but without any breakthrough seizures     Driving: not driving currently, pending DMV clearance.     Vitamin D: not taking     Women's issues in epilepsy: progesterone OCP    Current Medications:   Current Outpatient Medications:     LISANDRO 0.35 MG tablet, TAKE 1 TABLET BY MOUTH EVERY DAY FOR 84 DAYS, Disp: , Rfl:     levETIRAcetam (KEPPRA) 500 MG Tab, Take 1 Tablet by mouth 2 times a day for 90 days., Disp: 180 Tablet, Rfl: 0    acetaminophen (TYLENOL) 325 MG Tab, Take 2 Tablets by mouth every four hours as needed for Moderate Pain., Disp: 30 Tablet, Rfl: 0    Allergies: No Known Allergies      Physical Exam:   Ambulatory Vitals  Vitals:    04/29/24 0829   BP: 122/68   Pulse: 66   Resp: 16   Temp: 36.8 °C (98.3 °F)   SpO2: 97%       Constitutional: Well-developed, well-nourished, good hygiene. Appears stated age.  Respiratory: normal respiratory effort  Skin: Warm, dry, intact. No rashes observed.  Neurologic:   Mental Status: Awake, alert, oriented x 4.   Speech: Fluent with normal prosody.   Memory: Able to recall recent and remote events accurately.    Concentration: Attentive. Able to focus on history and follow multi-step commands.   Fund of  Knowledge: Appropriate.   Cranial Nerves:    CN II: PERRL    CN III, IV, VI: EOMI without nystagmus    CN VII: No facial asymmetry    CN VIII: Hearing intact to voice    CN IX and X: Palate elevates symmetrically, gag reflex not tested    CN XI: Symmetric shoulder shrug     CN XII: Tongue midline   Motor: 5/5 in upper and lower extremities bilaterally   Sensory: Intact and equal to light touch diffusely    Coordination: No evidence of past-pointing on finger to nose testing, no dysdiadochokinesia. Heel to shin intact.    Gait: ambulates steadily without assistive device. Able to perform tandem gait.    Movements: No resting tremors or abnormal movements observed.     Studies:      Labs reviewed: none recent pertinent    Imaging:   MRI Brain wwo contrast 1/16/2024  IMPRESSION:  Contrast enhanced brain  MRI within normal limits.     EEG Results:   Routine EEG 1/17/2024  INTERPRETATION:   Abnormal video EEG recording in the awake state(s):  1) Frequent 2-3 second bursts of approximately 3 Hz high amplitude spike and polyspike wave discharges  2) Occasional generalized bifrontally predominant sharp waves, a finding likely representing generalized spike wave fragments.   3) A photoparoxysmal response was captured at multiple frequencies, of similar morphology to the generalized spike and polyspike waves seen outside of photic stimulation.     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      Assessment/Plan:   Vika Alvarez is a 28 y.o. woman with a history of episodes of eye fluttering and upgaze during childhood and witnessed generalized tonic-clonic seizure in January 2024 being seen in follow up. A possible provoking factor for the recent seizure was sleep deprivation/stress as it occurred 2 days after delivering her second child. 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     She  has been seizure free for > 3 months. DMV form filled out to resume driving. Form faxed to DM.     Recommended starting pyridoxine 100 mg daily with Keppra for mood side effects. This was sent to preferred pharmacy. Discussed potentially switching to a different medication, patient wants to stay on Keppra for now since it is effective at controlling seizures. Information about alternatives (Vimpat, Lamictal) was provided in AVS if she decides to pursue cross-titration going forward. She denies a history of episodic jerking that would raise concern for hx of myoclonic seizures.       Follow up in approximately 3 months.       Katty Au M.D.   Diplomate, Neurology with Special Qualification in Epilepsy, American Board of Psychiatry and Neurology   of Clinical Neurology, [facility] [employer]  Level III Epilepsy Center, Department of Neurology at [facility]   4/29/2024      During today's encounter we discussed available treatment options and their individual side effect profiles. Total encounter time caring for patient today 30 minutes.

## 2024-07-29 ENCOUNTER — OFFICE VISIT (OUTPATIENT)
Dept: NEUROLOGY | Facility: MEDICAL CENTER | Age: 29
End: 2024-07-29
Attending: STUDENT IN AN ORGANIZED HEALTH CARE EDUCATION/TRAINING PROGRAM
Payer: COMMERCIAL

## 2024-07-29 VITALS
SYSTOLIC BLOOD PRESSURE: 100 MMHG | BODY MASS INDEX: 27.33 KG/M2 | WEIGHT: 160.05 LBS | DIASTOLIC BLOOD PRESSURE: 56 MMHG | RESPIRATION RATE: 14 BRPM | OXYGEN SATURATION: 98 % | HEART RATE: 74 BPM | HEIGHT: 64 IN

## 2024-07-29 DIAGNOSIS — G40.309 GENERALIZED EPILEPSY (HCC): ICD-10-CM

## 2024-07-29 DIAGNOSIS — T88.7XXA MEDICATION SIDE EFFECTS: ICD-10-CM

## 2024-07-29 PROCEDURE — 99211 OFF/OP EST MAY X REQ PHY/QHP: CPT | Performed by: STUDENT IN AN ORGANIZED HEALTH CARE EDUCATION/TRAINING PROGRAM

## 2024-07-29 RX ORDER — LAMOTRIGINE 25 MG/1
TABLET ORAL
Qty: 162 TABLET | Refills: 0 | Status: SHIPPED | OUTPATIENT
Start: 2024-07-29 | End: 2024-09-25

## 2024-07-29 ASSESSMENT — FIBROSIS 4 INDEX: FIB4 SCORE: 0.74

## 2024-09-09 ENCOUNTER — OFFICE VISIT (OUTPATIENT)
Dept: NEUROLOGY | Facility: MEDICAL CENTER | Age: 29
End: 2024-09-09
Attending: PSYCHIATRY & NEUROLOGY
Payer: COMMERCIAL

## 2024-09-09 VITALS
HEIGHT: 64 IN | OXYGEN SATURATION: 96 % | WEIGHT: 158.29 LBS | DIASTOLIC BLOOD PRESSURE: 70 MMHG | BODY MASS INDEX: 27.02 KG/M2 | SYSTOLIC BLOOD PRESSURE: 118 MMHG | HEART RATE: 76 BPM | TEMPERATURE: 97.8 F

## 2024-09-09 DIAGNOSIS — G40.309 GENERALIZED EPILEPSY (HCC): ICD-10-CM

## 2024-09-09 PROCEDURE — 3074F SYST BP LT 130 MM HG: CPT | Performed by: PSYCHIATRY & NEUROLOGY

## 2024-09-09 PROCEDURE — 99214 OFFICE O/P EST MOD 30 MIN: CPT | Performed by: PSYCHIATRY & NEUROLOGY

## 2024-09-09 PROCEDURE — 99211 OFF/OP EST MAY X REQ PHY/QHP: CPT | Performed by: PSYCHIATRY & NEUROLOGY

## 2024-09-09 PROCEDURE — 3078F DIAST BP <80 MM HG: CPT | Performed by: PSYCHIATRY & NEUROLOGY

## 2024-09-09 RX ORDER — LAMOTRIGINE 25 MG/1
50 TABLET ORAL 2 TIMES DAILY
Qty: 120 TABLET | Refills: 5 | Status: SHIPPED | OUTPATIENT
Start: 2024-09-09

## 2024-09-09 ASSESSMENT — FIBROSIS 4 INDEX: FIB4 SCORE: 0.74

## 2024-09-09 ASSESSMENT — PATIENT HEALTH QUESTIONNAIRE - PHQ9: CLINICAL INTERPRETATION OF PHQ2 SCORE: 0

## 2024-09-09 NOTE — PROGRESS NOTES
[facility]  Follow up visit    Patient name: Vika Alvarez  YOB: 1995  MRN: [id number]  Date of visit: 09/09/2024      INTERIM HISTORY (09/09/2024): The patient had no seizures in the interim. No episodes of disorientation since she started lamotrigine.  She was switched from Keppra to lamotrigine at the time of the last visit and feels better. She is still taking lamotrigine 25 mg BID. No adverse effects.    No recent fevers. Mood is stable. No SI/HI.         9/9/2024    10:30 AM   PHQ-9 Screening   Little interest or pleasure in doing things 0 - not at all   Feeling down, depressed, or hopeless 0 - not at all   PHQ-2 Total Score 0     [place] Suicide Reassessment  New or continued thoughts about killing self?: No  Preparing to end life?: No    Mental Status: The patient is alert and oriented to person, place, time, and situation. Speech is fluent, with no aphasia nor dysarthria noted. Affect is normal.    Cranial Nerve Examination:  CN I: Olfaction examination is deferred.  CN II: Visual fields are full to confrontation examination and show no visual field defect.  CN III, IV, VI: Eye movements are normal in all directions. Pupils are reactive to direct and consensual light. There is no relative afferent pupillary defect. There is no nystagmus.  CN V: Facial sensation to light touch is intact throughout.   CN VII: No significant facial muscle or other soft tissue asymmetry.  CN VIII: Hearing intact to rubbing sounds bilaterally.   CN IX, X: Soft palate elevates symmetrically.  CN XI: Symmetrical shoulder shrug exam.  CN XII: Midline tongue protrusion and moves symmetrically to each side.     Motor Examination:  Muscle strength is intact throughout. Muscle tone is normal throughout. No abnormal movements are observed. No pronator drift is noted.     Muscle Stretch Reflexes Examination:  Muscle stretch reflexes are normal hroughout and symmetric.    Sensory  "Examination:  Preserved sensation to light touch in all extremities.     Coordination:  Normal finger to nose testing bilaterally, no postural nor intentional tremor was noted.     Stance/gait:  Normal regular gait with normal arm swings and stride length. Able to perform tandem gait. Romberg sign is absent.     Background (as per Dr. Au last note):    Vika Alvarez is a 28 y.o. woman being seen in follow up for seizures.     Details of history:  She was admitted in 2024 for delivery of her second child. Two days after delivery on 2024 she had been breastfeeding her baby in a chair, then exhibited sudden onset head turn followed by generalized convulsion. Neurology followed her while hospitalized.      Prior to delivering she had been waking up between 2-3 AM for about an hour then go back to sleep. She was sleeping 7-9 hours at that time. Sleep has been back to normal for a  in the context of having a .     When taking the medication twice daily she had headaches, fatigue, irritability, depressed mood. She is currently taking 750 mg in the morning.      No further seizures.      Onset: was evaluated at age 10, had not been diagnosed with seizures  Semiology: 1) During childhood she would have episodes of eye fluttering and zoning out, approximately 8 total. Family would tell her she needed to go to sleep. 2) most recent seizure 2024  Frequency: 8 total events in childhood; 2) most recent seizures - she turned her head to the right side. She had minor whole body \"tremors\" with muscle rigidity. No large convulsion.  assisted her to the ground. (+) tongue biting - bilateral. No urinary incontinence  Duration of spells: 3-5 minutes, 1-2 minutes for latter two seizures.   Triggers: sleep deprivation, stress  Previous treatments: self-decreased to Keppra 750 mg in AM.  Status Epilepticus: no    No history of twitching episodes or dropping objects unexpectedly.      Risk " "factors for epileptic seizures:  Normal birth history, no complications, born at term.   No history of significant head trauma.   No history of stroke or meningitis.  No family history of epilepsy.   No febrile seizures.      She smokes cigarettes occasionally. Prior to pregnancy she drank socially, currently drinks 1-2 beers a couple times a week socially. No drug use.      She had Bell's palsy between 2nd and 3rd trimesters.      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     She has two biological children, ages 5 and 7 months. She also takes care of two stepchildren who are preteens.       Interval history (07/29/2024, per Dr. Au):  Patient is accompanied to clinic by her  who supplements the history.    No interval seizures.    She has stopped taking the Keppra mainly due to fatigue. Mood is also better since stopping it. She feels much better and has not had any breakthrough seizures. She does have episodes of headache where her eyes feel heavy, and also brief episodes of losing train of thought for a couple of seconds. If she feels \"off\" like this she will take a dose of Keppra.     Current treatment: Lamotrigine 25 mg BID.     Last seizure: 1/16/2024     Mood: much better since stopping Keppra     Side effects: n/a     Barriers to taking medication appropriately: none, has forgotten nighttime dose 3x in interim but without any breakthrough seizures     Driving: currently driving     Vitamin D: taking     Women's issues in epilepsy: progesterone OCP    Current Medications:   Current Outpatient Medications:     lamoTRIgine (LAMICTAL) 25 MG Tab, Take 1 Tablet by mouth every day for 14 days, THEN 1 Tablet 2 times a day for 14 days, THEN 2 Tablets 2 times a day for 30 days., Disp: 162 Tablet, Rfl: 0    levETIRAcetam (KEPPRA) 500 MG Tab, Take 1 Tablet by mouth 2 times a day for 360 days., Disp: 180 Tablet, Rfl: 3    LISANDRO 0.35 MG tablet, TAKE 1 TABLET BY MOUTH EVERY DAY FOR 84 " DAYS, Disp: , Rfl:     acetaminophen (TYLENOL) 325 MG Tab, Take 2 Tablets by mouth every four hours as needed for Moderate Pain., Disp: 30 Tablet, Rfl: 0    Allergies: No Known Allergies      Physical Exam:   Ambulatory Vitals  Vitals:    09/09/24 1042   BP: 118/70   Pulse: 76   Temp: 36.6 °C (97.8 °F)   SpO2: 96%       Constitutional: Well-developed, well-nourished, good hygiene. Appears stated age.  Respiratory: normal respiratory effort  Skin: Warm, dry, intact. No rashes observed.  Neurologic:   Mental Status: Awake, alert, oriented x 4.   Speech: Fluent with normal prosody.   Memory: Able to recall recent and remote events accurately.    Concentration: Attentive. Able to focus on history.   Fund of Knowledge: Appropriate.   Cranial Nerves:    CN II: PERRL    CN III, IV, VI: EOMI without nystagmus    CN VII: No facial asymmetry    CN VIII: Hearing intact to voice   Motor: moving all extremities fully and equally    Gait: ambulates steadily without assistive device   Movements: No resting tremors or abnormal movements observed.       Studies:      Labs reviewed: none recent pertinent    Imaging:   MRI Brain wwo contrast 1/16/2024  IMPRESSION:  Contrast enhanced brain  MRI within normal limits.     EEG Results:   Routine EEG 1/17/2024  INTERPRETATION:   Abnormal video EEG recording in the awake state(s):  1) Frequent 2-3 second bursts of approximately 3 Hz high amplitude spike and polyspike wave discharges  2) Occasional generalized bifrontally predominant sharp waves, a finding likely representing generalized spike wave fragments.   3) A photoparoxysmal response was captured at multiple frequencies, of similar morphology to the generalized spike and polyspike waves seen outside of photic stimulation.     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      Assessment/Plan:   Vika Alvarez is a 28 y.o. woman with a history of episodes of  eye fluttering and upgaze during childhood and witnessed generalized tonic-clonic seizure in January 2024 being seen in follow up. A possible provoking factor for the recent seizure was sleep deprivation/stress as it occurred 2 days after delivering her second child. EEG and history are consistent with a generalized epilepsy. No seizures since January 2024.     The patient is doing well off Keppra.    She is doing well on lamotrigine and we will increase the dose to 50 mg BID. Adverse effects discussed.   - lamoTRIgine (LAMICTAL) 25 MG Tab; Take 2 Tablets by mouth 2 times a day.  Dispense: 120 Tablet; Refill: 5    We will plan for lamotrigine level.  - LAMOTRIGINE; Future    The patient remains at risk for seizures, which may be life threatening, and we need to follow closely for adverse effects of antiseizure medication, since she had significant adverse effects reported to the prior medication (we are increasing lamotrigine dose at this time).     Discussed pregnancy and OCP in context of lamotrigine use.    She is driving with no problems at this time.    Epilepsy counseling provided in writing.     Patient Instructions   Please take lamotrigine 50 mg twice daily (every 12 hours).    Please call our office immediately if you develop a rash, itching, or fever. If you have any new mouth blistering and a skin rash go to the ER immediately. These symptoms can be signs of a serious reaction to the medication. We increase the dose slowly because doing so typically prevents these issues from happening.     Common side effects could include dizziness, headache, blurred vision, nausea, sleepiness, nasal congestion. Please let us know if you experience any bothersome side effects.    When you are getting low on the pills, please call or message the office for a refill.    Please let our office know if you have any changes in your seizure frequency and/characteristics. Otherwise, please keep the diary of your events and  bring it with you at the time of your next follow up visit with our office.     Please take vitamin D3 [id number] internation units daily.     Please take folic acid 1 mg daily. This is an over-the-counter supplement that is recommended to prevent certain developmental problems in your baby, in case you become pregnant in the future.    Please let our office know as soon as you become pregnant or plan to become pregnant.    If you are caring for a baby/young child, please make sure to be sitting on a soft surface while holding your baby/young child, so in case you have a seizure, your baby/young child is not injured due to fall.     Please let us know if you observe that your baby is excessively sleepy/has other changes and the pediatrician feels that there are no other explanations except possible adverse effects of antiseizure medication(s) your are taking while nursing your baby.     Please note that the following might precipitate seizures: missed doses of antiseizure medications, being sick with fever, stress, fatigue, sleep deprivation, not eating regularly, not drinking enough water, drinking too much alcohol, stopping alcohol suddenly if you are currently using it on a regular/daily basis, and/or using recreational drugs, among others.    Please note that the following might lead to an injury, potentially a life-threatening injury, in case you have a seizure and/or lose awareness while:   - being in a large body of water by yourself, such as bath, pool, lake, ocean, among others (risk of drowning)   - being on unprotected heights (risk of fall)   - being around and/or operating heavy machinery (risk of injury)   - being around open fire/hot surfaces (risk of burns)   - any other activities/circumstances, in which if you lose awareness, you might injure yourself and/or others.    Please call for help (crisis line and/or 911) in case you have thoughts of harming yourself and/or others.    Please stop driving if  you experience any changes/loss of awareness and/or seizures.     ------------------------------------------------------------------------------------------  Instructions for your family/caregivers:  Please call 911 if the patient has a seizure longer than 2-3 minutes, if seizures are back to back without her recovering to her baseline, or she does not start recovering within 5-10 minutes after the seizure stops. During the seizure - please turn her on her side, please make sure her head is protected (for example, you should put a pillow under her head, if one is available), and please do not put anything in her mouth.   -------------------------------------------------------------------------------------------    It is important that your seizures are well controlled and you have none or have them rarely. In addition to avoiding injury related to breakthrough seizures, frequent seizures increase risk of SUDEP (sudden unexpected death in epilepsy), where a person goes into a seizure and then never wakes up - this is a rare complication of seizure disorder; one of the best available ways to prevent it is to control your seizures well.     Due to the high volume of patients we are trying to help, your physician will not be able to respond by phone or in [email or web address] to your routine concerns between appointments.  This does not reflect a lack of interest or concern for you or your diagnosis.  Please bring these questions and concerns to your appointment where your physician can answer.  Please relay more pressing concerns to our office, either via [email or web address], or by phone; if not able to reach us please visit nearby Urgent Care Center or Emergency Department.  If any emergent medical needs, please seek emergent medical help and/or call 911.    Please note that we are not able to fill out paperwork that might be related to your work, utility company, disability, and/or driving, among others, in between the visits.  Please  schedule a dedicated appointment to address your paperwork, so we can do that in a timely manner.  This is not due to lack of concern or interest for your disease-related work/administrative problems, but to make sure that we provide the best possible care and to fill out your paperwork in a correct and timely manner.    Thank you for entrusting your neurological care to [facility] and we look forward to continuing to serve you.     Follow up in 6-8 weeks with me.     Jerad Castro MD  Neurology Attending, Epilepsy Program  [facility]

## 2024-09-09 NOTE — PATIENT INSTRUCTIONS
Please take lamotrigine 50 mg twice daily (every 12 hours).    Please call our office immediately if you develop a rash, itching, or fever. If you have any new mouth blistering and a skin rash go to the ER immediately. These symptoms can be signs of a serious reaction to the medication. We increase the dose slowly because doing so typically prevents these issues from happening.     Common side effects could include dizziness, headache, blurred vision, nausea, sleepiness, nasal congestion. Please let us know if you experience any bothersome side effects.    When you are getting low on the pills, please call or message the office for a refill.    Please let our office know if you have any changes in your seizure frequency and/characteristics. Otherwise, please keep the diary of your events and bring it with you at the time of your next follow up visit with our office.     Please take vitamin D3 [id number] internation units daily.     Please take folic acid 1 mg daily. This is an over-the-counter supplement that is recommended to prevent certain developmental problems in your baby, in case you become pregnant in the future.    Please let our office know as soon as you become pregnant or plan to become pregnant.    If you are caring for a baby/young child, please make sure to be sitting on a soft surface while holding your baby/young child, so in case you have a seizure, your baby/young child is not injured due to fall.     Please let us know if you observe that your baby is excessively sleepy/has other changes and the pediatrician feels that there are no other explanations except possible adverse effects of antiseizure medication(s) your are taking while nursing your baby.     Please note that the following might precipitate seizures: missed doses of antiseizure medications, being sick with fever, stress, fatigue, sleep deprivation, not eating regularly, not drinking enough water, drinking too much alcohol, stopping  alcohol suddenly if you are currently using it on a regular/daily basis, and/or using recreational drugs, among others.    Please note that the following might lead to an injury, potentially a life-threatening injury, in case you have a seizure and/or lose awareness while:   - being in a large body of water by yourself, such as bath, pool, lake, ocean, among others (risk of drowning)   - being on unprotected heights (risk of fall)   - being around and/or operating heavy machinery (risk of injury)   - being around open fire/hot surfaces (risk of burns)   - any other activities/circumstances, in which if you lose awareness, you might injure yourself and/or others.    Please call for help (crisis line and/or 911) in case you have thoughts of harming yourself and/or others.    Please stop driving if you experience any changes/loss of awareness and/or seizures.     ------------------------------------------------------------------------------------------  Instructions for your family/caregivers:  Please call 911 if the patient has a seizure longer than 2-3 minutes, if seizures are back to back without her recovering to her baseline, or she does not start recovering within 5-10 minutes after the seizure stops. During the seizure - please turn her on her side, please make sure her head is protected (for example, you should put a pillow under her head, if one is available), and please do not put anything in her mouth.   -------------------------------------------------------------------------------------------    It is important that your seizures are well controlled and you have none or have them rarely. In addition to avoiding injury related to breakthrough seizures, frequent seizures increase risk of SUDEP (sudden unexpected death in epilepsy), where a person goes into a seizure and then never wakes up - this is a rare complication of seizure disorder; one of the best available ways to prevent it is to control your  seizures well.     Due to the high volume of patients we are trying to help, your physician will not be able to respond by phone or in MyChart to your routine concerns between appointments.  This does not reflect a lack of interest or concern for you or your diagnosis.  Please bring these questions and concerns to your appointment where your physician can answer.  Please relay more pressing concerns to our office, either via MyChart, or by phone; if not able to reach us please visit nearby Urgent Care Center or Emergency Department.  If any emergent medical needs, please seek emergent medical help and/or call 911.    Please note that we are not able to fill out paperwork that might be related to your work, utility company, disability, and/or driving, among others, in between the visits.  Please schedule a dedicated appointment to address your paperwork, so we can do that in a timely manner.  This is not due to lack of concern or interest for your disease-related work/administrative problems, but to make sure that we provide the best possible care and to fill out your paperwork in a correct and timely manner.    Thank you for entrusting your neurological care to [facility] and we look forward to continuing to serve you.

## 2024-09-26 ENCOUNTER — TELEPHONE (OUTPATIENT)
Dept: NEUROLOGY | Facility: MEDICAL CENTER | Age: 29
End: 2024-09-26
Payer: COMMERCIAL

## 2024-09-27 ENCOUNTER — TELEPHONE (OUTPATIENT)
Dept: NEUROLOGY | Facility: MEDICAL CENTER | Age: 29
End: 2024-09-27
Payer: COMMERCIAL

## 2024-09-27 NOTE — TELEPHONE ENCOUNTER
Patient called and wanted to let us know that she is pregnant and wanted to know if her medication is safe to take while being pregnant

## 2024-09-29 NOTE — TELEPHONE ENCOUNTER
Noted. It is safe for her to continue lamotrigine during the pregnancy. She needs to discuss her other medications with her PCP/ObGyn.     Since we need to discuss lamotrigine dose/frequent lamotrigine level checks, please schedule the patient for this upcoming Wednesday. Ok to double book. Thank you.